# Patient Record
Sex: MALE | Race: WHITE | NOT HISPANIC OR LATINO | URBAN - METROPOLITAN AREA
[De-identification: names, ages, dates, MRNs, and addresses within clinical notes are randomized per-mention and may not be internally consistent; named-entity substitution may affect disease eponyms.]

---

## 2024-11-22 ENCOUNTER — OFFICE VISIT (OUTPATIENT)
Dept: URGENT CARE | Age: 49
End: 2024-11-22

## 2024-11-22 ENCOUNTER — APPOINTMENT (OUTPATIENT)
Dept: CARDIOLOGY | Facility: HOSPITAL | Age: 49
End: 2024-11-22

## 2024-11-22 ENCOUNTER — APPOINTMENT (OUTPATIENT)
Dept: RADIOLOGY | Facility: HOSPITAL | Age: 49
End: 2024-11-22

## 2024-11-22 ENCOUNTER — HOSPITAL ENCOUNTER (INPATIENT)
Facility: HOSPITAL | Age: 49
End: 2024-11-22
Attending: INTERNAL MEDICINE | Admitting: HOSPITALIST

## 2024-11-22 DIAGNOSIS — I21.4 NSTEMI (NON-ST ELEVATED MYOCARDIAL INFARCTION) (MULTI): ICD-10-CM

## 2024-11-22 DIAGNOSIS — I50.9 ACUTE CONGESTIVE HEART FAILURE, UNSPECIFIED HEART FAILURE TYPE: Primary | ICD-10-CM

## 2024-11-22 DIAGNOSIS — I16.0 HYPERTENSIVE URGENCY: ICD-10-CM

## 2024-11-22 DIAGNOSIS — R06.02 SOB (SHORTNESS OF BREATH): Primary | ICD-10-CM

## 2024-11-22 LAB
ALBUMIN SERPL BCP-MCNC: 4.5 G/DL (ref 3.4–5)
ALP SERPL-CCNC: 105 U/L (ref 33–120)
ALT SERPL W P-5'-P-CCNC: 28 U/L (ref 10–52)
ANION GAP SERPL CALC-SCNC: 18 MMOL/L (ref 10–20)
AST SERPL W P-5'-P-CCNC: 25 U/L (ref 9–39)
BASOPHILS # BLD AUTO: 0.05 X10*3/UL (ref 0–0.1)
BASOPHILS NFR BLD AUTO: 0.6 %
BILIRUB SERPL-MCNC: 1.9 MG/DL (ref 0–1.2)
BNP SERPL-MCNC: 1436 PG/ML (ref 0–99)
BUN SERPL-MCNC: 17 MG/DL (ref 6–23)
CALCIUM SERPL-MCNC: 8.8 MG/DL (ref 8.6–10.3)
CARDIAC TROPONIN I PNL SERPL HS: 610 NG/L (ref 0–20)
CARDIAC TROPONIN I PNL SERPL HS: 675 NG/L (ref 0–20)
CHLORIDE SERPL-SCNC: 102 MMOL/L (ref 98–107)
CO2 SERPL-SCNC: 22 MMOL/L (ref 21–32)
CREAT SERPL-MCNC: 1.39 MG/DL (ref 0.5–1.3)
D DIMER PPP FEU-MCNC: 2261 NG/ML FEU
EGFRCR SERPLBLD CKD-EPI 2021: 62 ML/MIN/1.73M*2
EOSINOPHIL # BLD AUTO: 0.07 X10*3/UL (ref 0–0.7)
EOSINOPHIL NFR BLD AUTO: 0.8 %
ERYTHROCYTE [DISTWIDTH] IN BLOOD BY AUTOMATED COUNT: 13.9 % (ref 11.5–14.5)
FLUAV RNA RESP QL NAA+PROBE: NOT DETECTED
FLUBV RNA RESP QL NAA+PROBE: NOT DETECTED
GLUCOSE SERPL-MCNC: 150 MG/DL (ref 74–99)
HCT VFR BLD AUTO: 47.4 % (ref 41–52)
HGB BLD-MCNC: 15.6 G/DL (ref 13.5–17.5)
IMM GRANULOCYTES # BLD AUTO: 0.02 X10*3/UL (ref 0–0.7)
IMM GRANULOCYTES NFR BLD AUTO: 0.2 % (ref 0–0.9)
LACTATE SERPL-SCNC: 1.3 MMOL/L (ref 0.4–2)
LYMPHOCYTES # BLD AUTO: 0.89 X10*3/UL (ref 1.2–4.8)
LYMPHOCYTES NFR BLD AUTO: 9.8 %
MAGNESIUM SERPL-MCNC: 1.96 MG/DL (ref 1.6–2.4)
MCH RBC QN AUTO: 28.1 PG (ref 26–34)
MCHC RBC AUTO-ENTMCNC: 32.9 G/DL (ref 32–36)
MCV RBC AUTO: 85 FL (ref 80–100)
MONOCYTES # BLD AUTO: 0.66 X10*3/UL (ref 0.1–1)
MONOCYTES NFR BLD AUTO: 7.3 %
NEUTROPHILS # BLD AUTO: 7.4 X10*3/UL (ref 1.2–7.7)
NEUTROPHILS NFR BLD AUTO: 81.3 %
NRBC BLD-RTO: 0 /100 WBCS (ref 0–0)
PLATELET # BLD AUTO: 205 X10*3/UL (ref 150–450)
POTASSIUM SERPL-SCNC: 3.7 MMOL/L (ref 3.5–5.3)
PROT SERPL-MCNC: 7.2 G/DL (ref 6.4–8.2)
RBC # BLD AUTO: 5.56 X10*6/UL (ref 4.5–5.9)
RSV RNA RESP QL NAA+PROBE: NOT DETECTED
SARS-COV-2 RNA RESP QL NAA+PROBE: NOT DETECTED
SODIUM SERPL-SCNC: 138 MMOL/L (ref 136–145)
TSH SERPL-ACNC: 3.02 MIU/L (ref 0.44–3.98)
WBC # BLD AUTO: 9.1 X10*3/UL (ref 4.4–11.3)

## 2024-11-22 PROCEDURE — 83036 HEMOGLOBIN GLYCOSYLATED A1C: CPT | Mod: AHULAB | Performed by: HOSPITALIST

## 2024-11-22 PROCEDURE — 80053 COMPREHEN METABOLIC PANEL: CPT | Performed by: INTERNAL MEDICINE

## 2024-11-22 PROCEDURE — 2500000001 HC RX 250 WO HCPCS SELF ADMINISTERED DRUGS (ALT 637 FOR MEDICARE OP)

## 2024-11-22 PROCEDURE — 84484 ASSAY OF TROPONIN QUANT: CPT | Performed by: INTERNAL MEDICINE

## 2024-11-22 PROCEDURE — 83605 ASSAY OF LACTIC ACID: CPT | Performed by: INTERNAL MEDICINE

## 2024-11-22 PROCEDURE — 99285 EMERGENCY DEPT VISIT HI MDM: CPT | Mod: 25 | Performed by: INTERNAL MEDICINE

## 2024-11-22 PROCEDURE — 85379 FIBRIN DEGRADATION QUANT: CPT | Performed by: INTERNAL MEDICINE

## 2024-11-22 PROCEDURE — 2550000001 HC RX 255 CONTRASTS: Performed by: INTERNAL MEDICINE

## 2024-11-22 PROCEDURE — 71046 X-RAY EXAM CHEST 2 VIEWS: CPT | Mod: FOREIGN READ | Performed by: RADIOLOGY

## 2024-11-22 PROCEDURE — 2500000001 HC RX 250 WO HCPCS SELF ADMINISTERED DRUGS (ALT 637 FOR MEDICARE OP): Performed by: INTERNAL MEDICINE

## 2024-11-22 PROCEDURE — 71275 CT ANGIOGRAPHY CHEST: CPT

## 2024-11-22 PROCEDURE — 96365 THER/PROPH/DIAG IV INF INIT: CPT

## 2024-11-22 PROCEDURE — 83735 ASSAY OF MAGNESIUM: CPT | Performed by: INTERNAL MEDICINE

## 2024-11-22 PROCEDURE — 93005 ELECTROCARDIOGRAM TRACING: CPT

## 2024-11-22 PROCEDURE — 85520 HEPARIN ASSAY: CPT | Performed by: INTERNAL MEDICINE

## 2024-11-22 PROCEDURE — 71275 CT ANGIOGRAPHY CHEST: CPT | Mod: FOREIGN READ | Performed by: RADIOLOGY

## 2024-11-22 PROCEDURE — 36415 COLL VENOUS BLD VENIPUNCTURE: CPT | Performed by: INTERNAL MEDICINE

## 2024-11-22 PROCEDURE — 96366 THER/PROPH/DIAG IV INF ADDON: CPT

## 2024-11-22 PROCEDURE — 85025 COMPLETE CBC W/AUTO DIFF WBC: CPT | Performed by: INTERNAL MEDICINE

## 2024-11-22 PROCEDURE — 71046 X-RAY EXAM CHEST 2 VIEWS: CPT

## 2024-11-22 PROCEDURE — 99223 1ST HOSP IP/OBS HIGH 75: CPT | Performed by: HOSPITALIST

## 2024-11-22 PROCEDURE — 2060000001 HC INTERMEDIATE ICU ROOM DAILY

## 2024-11-22 PROCEDURE — 96375 TX/PRO/DX INJ NEW DRUG ADDON: CPT

## 2024-11-22 PROCEDURE — 2500000004 HC RX 250 GENERAL PHARMACY W/ HCPCS (ALT 636 FOR OP/ED): Performed by: INTERNAL MEDICINE

## 2024-11-22 PROCEDURE — 83880 ASSAY OF NATRIURETIC PEPTIDE: CPT | Performed by: INTERNAL MEDICINE

## 2024-11-22 PROCEDURE — 87637 SARSCOV2&INF A&B&RSV AMP PRB: CPT | Performed by: INTERNAL MEDICINE

## 2024-11-22 PROCEDURE — 84443 ASSAY THYROID STIM HORMONE: CPT | Performed by: INTERNAL MEDICINE

## 2024-11-22 RX ORDER — NITROGLYCERIN 0.4 MG/1
0.4 TABLET SUBLINGUAL EVERY 5 MIN PRN
Status: DISCONTINUED | OUTPATIENT
Start: 2024-11-22 | End: 2024-11-26 | Stop reason: HOSPADM

## 2024-11-22 RX ORDER — NAPROXEN SODIUM 220 MG/1
TABLET, FILM COATED ORAL
Status: DISPENSED
Start: 2024-11-22 | End: 2024-11-23

## 2024-11-22 RX ORDER — NAPROXEN SODIUM 220 MG/1
324 TABLET, FILM COATED ORAL ONCE
Status: COMPLETED | OUTPATIENT
Start: 2024-11-22 | End: 2024-11-22

## 2024-11-22 RX ORDER — HYDRALAZINE HYDROCHLORIDE 20 MG/ML
20 INJECTION INTRAMUSCULAR; INTRAVENOUS ONCE
Status: COMPLETED | OUTPATIENT
Start: 2024-11-22 | End: 2024-11-22

## 2024-11-22 RX ORDER — FUROSEMIDE 10 MG/ML
40 INJECTION INTRAMUSCULAR; INTRAVENOUS ONCE
Status: COMPLETED | OUTPATIENT
Start: 2024-11-22 | End: 2024-11-22

## 2024-11-22 RX ORDER — HEPARIN SODIUM 10000 [USP'U]/100ML
0-4000 INJECTION, SOLUTION INTRAVENOUS CONTINUOUS
Status: DISCONTINUED | OUTPATIENT
Start: 2024-11-22 | End: 2024-11-23

## 2024-11-22 RX ORDER — LABETALOL HYDROCHLORIDE 5 MG/ML
20 INJECTION, SOLUTION INTRAVENOUS ONCE
Status: COMPLETED | OUTPATIENT
Start: 2024-11-22 | End: 2024-11-22

## 2024-11-22 RX ORDER — NITROGLYCERIN 0.4 MG/1
0.4 TABLET SUBLINGUAL ONCE
Status: COMPLETED | OUTPATIENT
Start: 2024-11-22 | End: 2024-11-22

## 2024-11-22 RX ORDER — MORPHINE SULFATE 4 MG/ML
4 INJECTION, SOLUTION INTRAMUSCULAR; INTRAVENOUS ONCE
Status: COMPLETED | OUTPATIENT
Start: 2024-11-22 | End: 2024-11-22

## 2024-11-22 RX ORDER — NITROGLYCERIN 0.4 MG/1
TABLET SUBLINGUAL
Status: COMPLETED
Start: 2024-11-22 | End: 2024-11-22

## 2024-11-22 ASSESSMENT — PAIN - FUNCTIONAL ASSESSMENT
PAIN_FUNCTIONAL_ASSESSMENT: 0-10
PAIN_FUNCTIONAL_ASSESSMENT: 0-10

## 2024-11-22 ASSESSMENT — PAIN DESCRIPTION - DESCRIPTORS
DESCRIPTORS: PRESSURE
DESCRIPTORS: PRESSURE

## 2024-11-22 ASSESSMENT — PAIN DESCRIPTION - PAIN TYPE: TYPE: ACUTE PAIN

## 2024-11-22 ASSESSMENT — PAIN SCALES - GENERAL
PAINLEVEL_OUTOF10: 4
PAINLEVEL_OUTOF10: 6
PAINLEVEL_OUTOF10: 4
PAINLEVEL_OUTOF10: 8
PAINLEVEL_OUTOF10: 0 - NO PAIN
PAINLEVEL_OUTOF10: 8

## 2024-11-22 ASSESSMENT — PAIN DESCRIPTION - LOCATION
LOCATION: CHEST
LOCATION: CHEST

## 2024-11-22 NOTE — ED PROVIDER NOTES
HPI     CC: No chief complaint on file.     HPI: Polo Orellana is a 49 y.o. male with no past medical history other than elevated BMI and possible HTN (has not seen a primary care physician in over 30 years) presents with chest pain and shortness of breath.  Patient states that for the past few months he has been experiencing exertional chest tightness and shortness of breath.  Symptoms acutely worsened today.  He has associated orthopnea, leg swelling, PND, and abdominal bloating over this time period.  He has a mild dry cough, denies fevers or chills.  He quit drinking about 2 weeks ago, previously drank 4-5 high ABV IPAs per day.  He is a non-smoker, does not use drugs.  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  He states he has been told his blood pressure is elevated when he has presented intermittently to urgent care in the past, has never been on blood pressure medications.  He was referred here from urgent care today.    ROS: 10-point review of systems was performed and is otherwise negative except as noted in HPI.    Limitations to history: N/A     Independent Historians: N/A    External Records Reviewed: Outpatient notes in EMR    Past Medical History: Noncontributory except per HPI     Past Surgical History: Noncontributory except per HPI     Family History: Reviewed and noncontributory     Social History:  Denies tobacco. Denies ETOH. Denies illicit drugs.    Social Determinants Affecting Care: N/A    No Known Allergies    Home Meds:   Current Outpatient Medications   Medication Instructions    oxygen (O2) gas therapy 1 each, inhalation, Continuous        Physical Exam     ED Triage Vitals [11/22/24 1439]   Temp Heart Rate Respirations BP   -- (!) 116 20 (!) 216/157      SpO2 Temp src Heart Rate Source Patient Position   -- -- Monitor Sitting      BP Location FiO2 (%)     Left arm --         Heart Rate:  []   Temperature:  [36.3 °C (97.3 °F)]    Respirations:  [20]   BP: (177-226)/(114-158)   Weight:  [135 kg (296 lb 8.3 oz)]      Physical Exam  Vitals and nursing note reviewed.     CONSTITUTIONAL: Well appearing, well nourished, obese male in no acute distress.   HENMT: Head atraumatic. Airway patent. Nasal mucosa clear. Mouth with normal mucosa, clear oropharynx. Uvula midline. Neck supple.    EYES: Clear bilaterally, pupils equally round and reactive to light.   CARDIOVASCULAR: Normal rate, regular rhythm.  Heart sounds S1, S2.  No murmurs, rubs or gallops. Normal pulses. Capillary refill < 2 sec.   RESPIRATORY: No increased work of breathing. Bibasilar rales R>L  GASTROINTESTINAL: Abdomen soft, protuberant, non-tender. No rebound, no guarding. Normal bowel sounds. No palpable masses.  GENITOURINARY:  No CVA tenderness.  MUSCULOSKELETAL: Spine appears normal, range of motion is not limited, no muscle or joint tenderness. 2+ BLE edema.   NEUROLOGICAL: Alert and oriented, no asymmetry, moving all extremities equally.  SKIN: Facial erythema. Warm, dry and intact. No rash or notable lesions.  PSYCHIATRIC: Normal mood and affect.  HEME/LYMPH: No adenopathy or splenomegaly.    Diagnostic Results      ECG: ECGs read and interpreted by me. See ED Course, below, for interpretation.    Labs Reviewed   CBC WITH AUTO DIFFERENTIAL - Abnormal       Result Value    WBC 9.1      nRBC 0.0      RBC 5.56      Hemoglobin 15.6      Hematocrit 47.4      MCV 85      MCH 28.1      MCHC 32.9      RDW 13.9      Platelets 205      Neutrophils % 81.3      Immature Granulocytes %, Automated 0.2      Lymphocytes % 9.8      Monocytes % 7.3      Eosinophils % 0.8      Basophils % 0.6      Neutrophils Absolute 7.40      Immature Granulocytes Absolute, Automated 0.02      Lymphocytes Absolute 0.89 (*)     Monocytes Absolute 0.66      Eosinophils Absolute 0.07      Basophils Absolute 0.05     COMPREHENSIVE METABOLIC PANEL - Abnormal    Glucose 150 (*)     Sodium 138      Potassium 3.7       Chloride 102      Bicarbonate 22      Anion Gap 18      Urea Nitrogen 17      Creatinine 1.39 (*)     eGFR 62      Calcium 8.8      Albumin 4.5      Alkaline Phosphatase 105      Total Protein 7.2      AST 25      Bilirubin, Total 1.9 (*)     ALT 28     B-TYPE NATRIURETIC PEPTIDE - Abnormal    BNP 1,436 (*)     Narrative:        <100 pg/mL - Heart failure unlikely  100-299 pg/mL - Intermediate probability of acute heart                  failure exacerbation. Correlate with clinical                  context and patient history.    >=300 pg/mL - Heart Failure likely. Correlate with clinical                  context and patient history.    BNP testing is performed using different testing methodology at [facility] than at other [facility]. Direct result comparisons should only be made within the same method.      D-DIMER, NON VTE - Abnormal    D-Dimer Non VTE, Quant (ng/mL FEU) 2,261 (*)     Narrative:     The D-Dimer assay is reported in ng/mL Fibrinogen Equivalent Units (FEU). The results of this assay should NOT be used for the exclusion of Deep Vein Thrombosis and/or Pulmonary Embolism.   SERIAL TROPONIN-INITIAL - Abnormal    Troponin I, High Sensitivity 610 (*)     Narrative:     Less than 99th percentile of normal range cutoff-  Female and children under 18 years old <14 ng/L; Male <21 ng/L: Negative  Repeat testing should be performed if clinically indicated.     Female and children under 18 years old 14-50 ng/L; Male 21-50 ng/L:  Consistent with possible cardiac damage and possible increased clinical   risk. Serial measurements may help to assess extent of myocardial damage.     >50 ng/L: Consistent with cardiac damage, increased clinical risk and  myocardial infarction. Serial measurements may help assess extent of   myocardial damage.      NOTE: Children less than 1 year old may have higher baseline troponin   levels and results should be interpreted in conjunction with the overall    clinical context.     NOTE: Troponin I testing is performed using a different   testing methodology at [facility] than at other   [facility]. Direct result comparisons should only   be made within the same method.   SERIAL TROPONIN, 1 HOUR - Abnormal    Troponin I, High Sensitivity 675 (*)     Narrative:     Less than 99th percentile of normal range cutoff-  Female and children under 18 years old <14 ng/L; Male <21 ng/L: Negative  Repeat testing should be performed if clinically indicated.     Female and children under 18 years old 14-50 ng/L; Male 21-50 ng/L:  Consistent with possible cardiac damage and possible increased clinical   risk. Serial measurements may help to assess extent of myocardial damage.     >50 ng/L: Consistent with cardiac damage, increased clinical risk and  myocardial infarction. Serial measurements may help assess extent of   myocardial damage.      NOTE: Children less than 1 year old may have higher baseline troponin   levels and results should be interpreted in conjunction with the overall   clinical context.     NOTE: Troponin I testing is performed using a different   testing methodology at [facility] than at other   [facility]. Direct result comparisons should only   be made within the same method.   MAGNESIUM - Normal    Magnesium 1.96     LACTATE - Normal    Lactate 1.3      Narrative:     Venipuncture immediately after or during the administration of Metamizole may lead to falsely low results. Testing should be performed immediately prior to Metamizole dosing.   TSH WITH REFLEX TO FREE T4 IF ABNORMAL - Normal    Thyroid Stimulating Hormone 3.02      Narrative:     TSH testing is performed using different testing methodology at [facility] than at other [facility]. Direct result comparisons should only be made within the same method.     SARS-COV-2 PCR - Normal    Coronavirus 2019, PCR Not Detected      Narrative:     This  assay has received FDA Emergency Use Authorization (EUA) and is only authorized for the duration of time that circumstances exist to justify the authorization of the emergency use of in vitro diagnostic tests for the detection of SARS-CoV-2 virus and/or diagnosis of COVID-19 infection under section 564(b)(1) of the Act, 21 U.S.C. 360bbb-3(b)(1). This assay is an in vitro diagnostic nucleic acid amplification test for the qualitative detection of SARS-CoV-2 from nasopharyngeal specimens and has been validated for use at [facility]. Negative results do not preclude COVID-19 infections and should not be used as the sole basis for diagnosis, treatment, or other management decisions.     RSV PCR - Normal    RSV PCR Not Detected      Narrative:     This assay is an FDA-cleared, in vitro diagnostic nucleic acid amplification test for the detection of RSV from nasopharyngeal specimens, and has been validated for use at [facility]. Negative results do not preclude RSV infections, and should not be used as the sole basis for diagnosis, treatment, or other management decisions. If Influenza A/B and RSV PCR results are negative, testing for Parainfluenza virus, Adenovirus and Metapneumovirus is routinely performed for pediatric oncology and intensive care inpatients at [facility], and is available on other patients by placing an add-on request.       INFLUENZA A AND B PCR - Normal    Flu A Result Not Detected      Flu B Result Not Detected      Narrative:     This assay is an in vitro diagnostic multiplex nucleic acid amplification test for the detection and discrimination of Influenza A & B from nasopharyngeal specimens, and has been validated for use at [facility]. Negative results do not preclude Influenza A/B infections, and should not be used as the sole basis for diagnosis, treatment, or other management decisions. If Influenza A/B and RSV PCR results are  negative, testing for Parainfluenza virus, Adenovirus and Metapneumovirus is routinely performed for [facility] pediatric oncology and intensive care inpatients, and is available on other patients by placing an add-on request.   TROPONIN SERIES- (INITIAL, 1 HR)    Narrative:     The following orders were created for panel order Troponin I Series, High Sensitivity (0, 1 HR).  Procedure                               Abnormality         Status                     ---------                               -----------         ------                     Troponin I, High Sensiti...[[id number]]  Abnormal            Final result               Troponin, High Sensitivi...[[id number]]  Abnormal            Final result                 Please view results for these tests on the individual orders.   TROPONIN I, HIGH SENSITIVITY         CT angio chest for pulmonary embolism   Final Result   1. No acute or chronic pulmonary emboli.   2. No thoracic aortic aneurysm or dissection.   3.  Moderate right and mild left pleural effusion.   4. Mild perihepatic ascites.   Signed by Bernard De La Vega MD      XR chest 2 views   Final Result   Mild pulmonary vascular congestion.   Signed by William Jackson M.D.                    Pansey Coma Scale Score: 15                  Procedure  Procedures    ED Course & MDM   Assessment/Plan:   Polo Orellana is a 49 y.o. male with no past medical history other than elevated BMI and possible HTN (has not seen a primary care physician in over 30 years) presents with chest pain and shortness of breath.  Symptoms worsened acutely today after a few months of symptoms.  Presentation is concerning for a possible acute CHF exacerbation given recent signs of peripheral edema, orthopnea, PND, leg and abdominal swelling.  He is notably hypertensive, suspicious for prolonged uncontrolled hypertension given lack of primary care.  ECG shows sinus tachycardia with LVH, left atrial enlargement, and left axis deviation.  No signs  acutely of DVT on exam but with tachycardia, leg swelling, and chest pain PE is on the differential.  Workup was initiated with labs including serial troponins and D-dimer, chest x-ray.  He was given a dose of labetalol for his blood pressure. See below for details of ED course and ultimate disposition.    Medications   aspirin chewable tablet  - Omnicell Override Pull (has no administration in time range)   nitroglycerin (Nitrostat) SL tablet 0.4 mg (0.4 mg sublingual Given 11/22/24 1705)   heparin 25,000 Units in dextrose 5% 250 mL (100 Units/mL) infusion (premix) (1,200 Units/hr intravenous New Bag 11/22/24 1846)   heparin bolus from bag 3,000-4,000 Units (has no administration in time range)   labetaloL (Normodyne,Trandate) injection 20 mg (20 mg intravenous Given 11/22/24 1622)   nitroglycerin (Nitrostat) SL tablet 0.4 mg (0.4 mg sublingual Given 11/22/24 1642)   aspirin chewable tablet 324 mg (324 mg oral Given 11/22/24 1640)   heparin bolus from bag 4,000 Units (4,000 Units intravenous Bolus from Bag 11/22/24 1851)   morphine injection 4 mg (4 mg intravenous Given 11/22/24 1822)   hydrALAZINE (Apresoline) injection 20 mg (20 mg intravenous Given 11/22/24 1838)   furosemide (Lasix) injection 40 mg (40 mg intravenous Given 11/22/24 1840)   iohexol (OMNIPaque) 350 mg iodine/mL solution 75 mL (75 mL intravenous Given 11/22/24 1829)        ED Course as of 11/22/24 2244   Fri Nov 22, 2024   1511 ECG read interpreted by me.  Sinus tachycardia, rate 116.  Leftward axis.  Left atrial large man.  Incomplete RBBB.  LVH.  No significant ST or T wave changes. [CG]   1631 CXR Mild pulmonary vascular congestion. [CG]   1632 Initial labs notable for normal CBC, CMP with elevated creatinine 1.39, provide using 1.96, elevated troponin 610 [CG]   1639  mg given and nitroglycerin trialed [CG]   1725 Pain improved a little with first dose of nitro but then recurred after second and third doses. [CG]   1725 Troponin rising  675.  Heparin drip started. [CG]   1726 D-dimer elevated 2260. CTPE ordered. [CG]   1726 BNP elevated 1436.  [CG]   1807 Primary 86.  Possible left atrial enlargement.  Incomplete RBBB.  No significant ST or T wave abnormalities. [CG]   1941 CTPE no PE. Moderate right and mild left pleural effusion. Mild perihepatic ascites.   [CG]   2030 Patient was admitted to the stepdown unit for further management.  I did discuss with the case with Dr. Young from cardiology will consult, suspect troponin elevation in the setting of type II MI from CHF/hypertension rather than true NSTEMI. [CG]      ED Course User Index  [CG] Sunshine Wade MD         Diagnoses as of 11/22/24 2244   Acute congestive heart failure, unspecified heart failure type   NSTEMI (non-ST elevated myocardial infarction) (Multi)   Hypertensive urgency       Disposition:   Admitted to SDU, discussed differential and findings with patient as well as any family members at bedside.      ED Prescriptions    None         Sunshine Wade MD  EM/IM/Peds    This note was dictated by speech recognition. Minor errors in transcription may be present.     Sunshine Wdae MD  11/22/24 7274

## 2024-11-22 NOTE — ED NOTES
He was working and has felt short of breathe with excersion. He was feeling sudden chest pressure so bad he felt like he couldn't breath. He does feel better than when he called. He recently quit drinking alcohol 2 weeks ago.      Dina Valenzuela RN  11/22/24 1125

## 2024-11-22 NOTE — PROGRESS NOTES
Subjective   History of Present Illness: Polo Orellana is a 49 y.o. male. They present today with a chief complaint of SOB and chest tightness x 2 hours ago. It started at his work. No PMH according to pt. No other symptoms.       Past Medical History  Allergies as of 11/22/2024    (No Known Allergies)       (Not in a hospital admission)       No past medical history on file.    Past Surgical History:   Procedure Laterality Date    OTHER SURGICAL HISTORY  11/20/2020    No history of surgery            Review of Systems  Review of Systems                               Objective    There were no vitals filed for this visit.  No LMP for male patient.    Physical Exam  Constitutional:       Appearance: He is ill-appearing.   HENT:      Head: Normocephalic and atraumatic.      Nose: Nose normal.      Mouth/Throat:      Mouth: Mucous membranes are moist.   Eyes:      Extraocular Movements: Extraocular movements intact.      Conjunctiva/sclera: Conjunctivae normal.      Pupils: Pupils are equal, round, and reactive to light.   Cardiovascular:      Rate and Rhythm: Tachycardia present.   Pulmonary:      Effort: Pulmonary effort is normal.   Skin:     General: Skin is warm.   Neurological:      Mental Status: He is alert and oriented to person, place, and time.   Psychiatric:         Mood and Affect: Mood normal.         Behavior: Behavior normal.             Point of Care Test & Imaging Results from this visit  No results found for this visit on 11/22/24.   No results found.    Diagnostic study results (if any) were reviewed by Carlita Ratliff PA-C.    Assessment/Plan   Allergies, medications, history, and pertinent labs/EKGs/Imaging reviewed by Carlita Ratliff PA-C.     Medical Decision Making  Pt pulse ox at triage was 88% and  he was put on 3L o2 NS. Pulse ox remained at 98%. EKG showed cardiac arrhythmias. Squad team was called to  and pt was transferred to the ED.     Orders and Diagnoses  Diagnoses and all  orders for this visit:  SOB (shortness of breath)  -     ECG 12 lead; Future  -     oxygen (O2) gas therapy; Inhale 1 each continuously.      Medical Admin Record      Patient disposition: ED    Electronically signed by Carlita Ratliff PA-C  2:12 PM

## 2024-11-23 ENCOUNTER — APPOINTMENT (OUTPATIENT)
Dept: RADIOLOGY | Facility: HOSPITAL | Age: 49
End: 2024-11-23

## 2024-11-23 LAB
ANION GAP SERPL CALC-SCNC: 13 MMOL/L (ref 10–20)
BASOPHILS # BLD AUTO: 0.06 X10*3/UL (ref 0–0.1)
BASOPHILS NFR BLD AUTO: 1 %
BUN SERPL-MCNC: 17 MG/DL (ref 6–23)
CALCIUM SERPL-MCNC: 8.3 MG/DL (ref 8.6–10.3)
CARDIAC TROPONIN I PNL SERPL HS: 704 NG/L (ref 0–20)
CARDIAC TROPONIN I PNL SERPL HS: 764 NG/L (ref 0–20)
CHLORIDE SERPL-SCNC: 104 MMOL/L (ref 98–107)
CHOLEST SERPL-MCNC: 133 MG/DL (ref 0–199)
CHOLESTEROL/HDL RATIO: 4.6
CO2 SERPL-SCNC: 27 MMOL/L (ref 21–32)
CREAT SERPL-MCNC: 1.29 MG/DL (ref 0.5–1.3)
EGFRCR SERPLBLD CKD-EPI 2021: 68 ML/MIN/1.73M*2
EOSINOPHIL # BLD AUTO: 0.14 X10*3/UL (ref 0–0.7)
EOSINOPHIL NFR BLD AUTO: 2.4 %
ERYTHROCYTE [DISTWIDTH] IN BLOOD BY AUTOMATED COUNT: 14.1 % (ref 11.5–14.5)
EST. AVERAGE GLUCOSE BLD GHB EST-MCNC: 157 MG/DL
GLUCOSE SERPL-MCNC: 147 MG/DL (ref 74–99)
HBA1C MFR BLD: 7.1 %
HCT VFR BLD AUTO: 44.6 % (ref 41–52)
HDLC SERPL-MCNC: 29.1 MG/DL
HGB BLD-MCNC: 14.7 G/DL (ref 13.5–17.5)
IMM GRANULOCYTES # BLD AUTO: 0.02 X10*3/UL (ref 0–0.7)
IMM GRANULOCYTES NFR BLD AUTO: 0.3 % (ref 0–0.9)
LDLC SERPL CALC-MCNC: 90 MG/DL
LYMPHOCYTES # BLD AUTO: 1.08 X10*3/UL (ref 1.2–4.8)
LYMPHOCYTES NFR BLD AUTO: 18.5 %
MAGNESIUM SERPL-MCNC: 1.97 MG/DL (ref 1.6–2.4)
MCH RBC QN AUTO: 27.9 PG (ref 26–34)
MCHC RBC AUTO-ENTMCNC: 33 G/DL (ref 32–36)
MCV RBC AUTO: 85 FL (ref 80–100)
MONOCYTES # BLD AUTO: 0.67 X10*3/UL (ref 0.1–1)
MONOCYTES NFR BLD AUTO: 11.5 %
NEUTROPHILS # BLD AUTO: 3.87 X10*3/UL (ref 1.2–7.7)
NEUTROPHILS NFR BLD AUTO: 66.3 %
NON HDL CHOLESTEROL: 104 MG/DL (ref 0–149)
NRBC BLD-RTO: 0 /100 WBCS (ref 0–0)
PLATELET # BLD AUTO: 200 X10*3/UL (ref 150–450)
POTASSIUM SERPL-SCNC: 3.7 MMOL/L (ref 3.5–5.3)
RBC # BLD AUTO: 5.26 X10*6/UL (ref 4.5–5.9)
SODIUM SERPL-SCNC: 140 MMOL/L (ref 136–145)
TRIGL SERPL-MCNC: 70 MG/DL (ref 0–149)
TSH SERPL-ACNC: 5.68 MIU/L (ref 0.44–3.98)
UFH PPP CHRO-ACNC: 0.2 IU/ML
UFH PPP CHRO-ACNC: 0.2 IU/ML
UFH PPP CHRO-ACNC: 0.4 IU/ML
VLDL: 14 MG/DL (ref 0–40)
WBC # BLD AUTO: 5.8 X10*3/UL (ref 4.4–11.3)

## 2024-11-23 PROCEDURE — 2500000004 HC RX 250 GENERAL PHARMACY W/ HCPCS (ALT 636 FOR OP/ED): Performed by: HOSPITALIST

## 2024-11-23 PROCEDURE — 36415 COLL VENOUS BLD VENIPUNCTURE: CPT | Performed by: INTERNAL MEDICINE

## 2024-11-23 PROCEDURE — 99233 SBSQ HOSP IP/OBS HIGH 50: CPT | Performed by: INTERNAL MEDICINE

## 2024-11-23 PROCEDURE — 84443 ASSAY THYROID STIM HORMONE: CPT | Performed by: HOSPITALIST

## 2024-11-23 PROCEDURE — 85520 HEPARIN ASSAY: CPT | Performed by: INTERNAL MEDICINE

## 2024-11-23 PROCEDURE — 99223 1ST HOSP IP/OBS HIGH 75: CPT | Performed by: INTERNAL MEDICINE

## 2024-11-23 PROCEDURE — 2060000001 HC INTERMEDIATE ICU ROOM DAILY

## 2024-11-23 PROCEDURE — 2500000001 HC RX 250 WO HCPCS SELF ADMINISTERED DRUGS (ALT 637 FOR MEDICARE OP): Performed by: INTERNAL MEDICINE

## 2024-11-23 PROCEDURE — 83735 ASSAY OF MAGNESIUM: CPT | Performed by: HOSPITALIST

## 2024-11-23 PROCEDURE — 76770 US EXAM ABDO BACK WALL COMP: CPT

## 2024-11-23 PROCEDURE — 2500000002 HC RX 250 W HCPCS SELF ADMINISTERED DRUGS (ALT 637 FOR MEDICARE OP, ALT 636 FOR OP/ED): Performed by: INTERNAL MEDICINE

## 2024-11-23 PROCEDURE — 2500000001 HC RX 250 WO HCPCS SELF ADMINISTERED DRUGS (ALT 637 FOR MEDICARE OP): Performed by: HOSPITALIST

## 2024-11-23 PROCEDURE — 84484 ASSAY OF TROPONIN QUANT: CPT | Performed by: NURSE PRACTITIONER

## 2024-11-23 PROCEDURE — 82374 ASSAY BLOOD CARBON DIOXIDE: CPT | Performed by: HOSPITALIST

## 2024-11-23 PROCEDURE — 80061 LIPID PANEL: CPT | Performed by: HOSPITALIST

## 2024-11-23 PROCEDURE — 85025 COMPLETE CBC W/AUTO DIFF WBC: CPT | Performed by: HOSPITALIST

## 2024-11-23 PROCEDURE — 76770 US EXAM ABDO BACK WALL COMP: CPT | Performed by: RADIOLOGY

## 2024-11-23 PROCEDURE — 2500000004 HC RX 250 GENERAL PHARMACY W/ HCPCS (ALT 636 FOR OP/ED): Performed by: INTERNAL MEDICINE

## 2024-11-23 RX ORDER — ASPIRIN 81 MG/1
81 TABLET ORAL DAILY
Status: DISCONTINUED | OUTPATIENT
Start: 2024-11-24 | End: 2024-11-26 | Stop reason: HOSPADM

## 2024-11-23 RX ORDER — HYDRALAZINE HYDROCHLORIDE 25 MG/1
25 TABLET, FILM COATED ORAL 3 TIMES DAILY
Status: DISCONTINUED | OUTPATIENT
Start: 2024-11-23 | End: 2024-11-23

## 2024-11-23 RX ORDER — ONDANSETRON 4 MG/1
4 TABLET, ORALLY DISINTEGRATING ORAL EVERY 8 HOURS PRN
Status: DISCONTINUED | OUTPATIENT
Start: 2024-11-23 | End: 2024-11-26 | Stop reason: HOSPADM

## 2024-11-23 RX ORDER — ATORVASTATIN CALCIUM 40 MG/1
40 TABLET, FILM COATED ORAL NIGHTLY
Status: DISCONTINUED | OUTPATIENT
Start: 2024-11-23 | End: 2024-11-23

## 2024-11-23 RX ORDER — SPIRONOLACTONE 25 MG/1
25 TABLET ORAL DAILY
Status: DISCONTINUED | OUTPATIENT
Start: 2024-11-23 | End: 2024-11-26 | Stop reason: HOSPADM

## 2024-11-23 RX ORDER — ENOXAPARIN SODIUM 100 MG/ML
40 INJECTION SUBCUTANEOUS EVERY 24 HOURS
Status: DISCONTINUED | OUTPATIENT
Start: 2024-11-24 | End: 2024-11-26 | Stop reason: HOSPADM

## 2024-11-23 RX ORDER — LOSARTAN POTASSIUM 50 MG/1
50 TABLET ORAL DAILY
Status: DISCONTINUED | OUTPATIENT
Start: 2024-11-23 | End: 2024-11-25

## 2024-11-23 RX ORDER — HYDRALAZINE HYDROCHLORIDE 20 MG/ML
10 INJECTION INTRAMUSCULAR; INTRAVENOUS EVERY 6 HOURS PRN
Status: DISCONTINUED | OUTPATIENT
Start: 2024-11-23 | End: 2024-11-26 | Stop reason: HOSPADM

## 2024-11-23 RX ORDER — ONDANSETRON HYDROCHLORIDE 2 MG/ML
4 INJECTION, SOLUTION INTRAVENOUS EVERY 8 HOURS PRN
Status: DISCONTINUED | OUTPATIENT
Start: 2024-11-23 | End: 2024-11-26 | Stop reason: HOSPADM

## 2024-11-23 RX ORDER — FUROSEMIDE 10 MG/ML
20 INJECTION INTRAMUSCULAR; INTRAVENOUS 2 TIMES DAILY
Status: DISCONTINUED | OUTPATIENT
Start: 2024-11-23 | End: 2024-11-25

## 2024-11-23 RX ORDER — METOPROLOL TARTRATE 25 MG/1
25 TABLET, FILM COATED ORAL 2 TIMES DAILY
Status: DISCONTINUED | OUTPATIENT
Start: 2024-11-23 | End: 2024-11-25

## 2024-11-23 RX ORDER — METOPROLOL TARTRATE 25 MG/1
25 TABLET, FILM COATED ORAL ONCE
Status: COMPLETED | OUTPATIENT
Start: 2024-11-23 | End: 2024-11-23

## 2024-11-23 SDOH — SOCIAL STABILITY: SOCIAL INSECURITY: DO YOU FEEL UNSAFE GOING BACK TO THE PLACE WHERE YOU ARE LIVING?: NO

## 2024-11-23 SDOH — SOCIAL STABILITY: SOCIAL INSECURITY: ARE YOU OR HAVE YOU BEEN THREATENED OR ABUSED PHYSICALLY, EMOTIONALLY, OR SEXUALLY BY ANYONE?: NO

## 2024-11-23 SDOH — SOCIAL STABILITY: SOCIAL INSECURITY: HAVE YOU HAD THOUGHTS OF HARMING ANYONE ELSE?: NO

## 2024-11-23 SDOH — SOCIAL STABILITY: SOCIAL INSECURITY: WERE YOU ABLE TO COMPLETE ALL THE BEHAVIORAL HEALTH SCREENINGS?: YES

## 2024-11-23 SDOH — SOCIAL STABILITY: SOCIAL INSECURITY: HAS ANYONE EVER THREATENED TO HURT YOUR FAMILY OR YOUR PETS?: NO

## 2024-11-23 SDOH — SOCIAL STABILITY: SOCIAL INSECURITY: DOES ANYONE TRY TO KEEP YOU FROM HAVING/CONTACTING OTHER FRIENDS OR DOING THINGS OUTSIDE YOUR HOME?: NO

## 2024-11-23 SDOH — SOCIAL STABILITY: SOCIAL INSECURITY: DO YOU FEEL ANYONE HAS EXPLOITED OR TAKEN ADVANTAGE OF YOU FINANCIALLY OR OF YOUR PERSONAL PROPERTY?: NO

## 2024-11-23 SDOH — SOCIAL STABILITY: SOCIAL INSECURITY: HAVE YOU HAD ANY THOUGHTS OF HARMING ANYONE ELSE?: NO

## 2024-11-23 SDOH — SOCIAL STABILITY: SOCIAL INSECURITY: ARE THERE ANY APPARENT SIGNS OF INJURIES/BEHAVIORS THAT COULD BE RELATED TO ABUSE/NEGLECT?: NO

## 2024-11-23 SDOH — SOCIAL STABILITY: SOCIAL INSECURITY: ABUSE: ADULT

## 2024-11-23 ASSESSMENT — COGNITIVE AND FUNCTIONAL STATUS - GENERAL
DAILY ACTIVITIY SCORE: 24
PATIENT BASELINE BEDBOUND: NO
MOBILITY SCORE: 24
DAILY ACTIVITIY SCORE: 24
DAILY ACTIVITIY SCORE: 24

## 2024-11-23 ASSESSMENT — LIFESTYLE VARIABLES
SKIP TO QUESTIONS 9-10: 1
AUDIT-C TOTAL SCORE: 0
HOW OFTEN DO YOU HAVE A DRINK CONTAINING ALCOHOL: NEVER
AUDIT-C TOTAL SCORE: 0
SUBSTANCE_ABUSE_PAST_12_MONTHS: NO
HOW MANY STANDARD DRINKS CONTAINING ALCOHOL DO YOU HAVE ON A TYPICAL DAY: PATIENT DOES NOT DRINK
PRESCIPTION_ABUSE_PAST_12_MONTHS: NO
HOW OFTEN DO YOU HAVE 6 OR MORE DRINKS ON ONE OCCASION: NEVER

## 2024-11-23 ASSESSMENT — ENCOUNTER SYMPTOMS
EYES NEGATIVE: 1
DIARRHEA: 0
SHORTNESS OF BREATH: 1
ALLERGIC/IMMUNOLOGIC NEGATIVE: 1
GASTROINTESTINAL NEGATIVE: 1
COUGH: 1
PSYCHIATRIC NEGATIVE: 1
CONSTITUTIONAL NEGATIVE: 1
NAUSEA: 0
CHEST TIGHTNESS: 1
MUSCULOSKELETAL NEGATIVE: 1
PALPITATIONS: 1
VOMITING: 0
NEUROLOGICAL NEGATIVE: 1
HEMATOLOGIC/LYMPHATIC NEGATIVE: 1

## 2024-11-23 ASSESSMENT — ACTIVITIES OF DAILY LIVING (ADL)
WALKS IN HOME: INDEPENDENT
PATIENT'S MEMORY ADEQUATE TO SAFELY COMPLETE DAILY ACTIVITIES?: YES
LACK_OF_TRANSPORTATION: NO
HEARING - LEFT EAR: FUNCTIONAL
LACK_OF_TRANSPORTATION: NO
HEARING - RIGHT EAR: FUNCTIONAL
JUDGMENT_ADEQUATE_SAFELY_COMPLETE_DAILY_ACTIVITIES: YES
BATHING: INDEPENDENT
TOILETING: NEEDS ASSISTANCE
ADEQUATE_TO_COMPLETE_ADL: YES
FEEDING YOURSELF: INDEPENDENT
LACK_OF_TRANSPORTATION: NO
LACK_OF_TRANSPORTATION: NO
DRESSING YOURSELF: INDEPENDENT
GROOMING: INDEPENDENT

## 2024-11-23 ASSESSMENT — PAIN - FUNCTIONAL ASSESSMENT
PAIN_FUNCTIONAL_ASSESSMENT: 0-10
PAIN_FUNCTIONAL_ASSESSMENT: 0-10

## 2024-11-23 ASSESSMENT — PATIENT HEALTH QUESTIONNAIRE - PHQ9
1. LITTLE INTEREST OR PLEASURE IN DOING THINGS: NOT AT ALL
2. FEELING DOWN, DEPRESSED OR HOPELESS: NOT AT ALL
SUM OF ALL RESPONSES TO PHQ9 QUESTIONS 1 & 2: 0

## 2024-11-23 ASSESSMENT — PAIN SCALES - GENERAL
PAINLEVEL_OUTOF10: 0 - NO PAIN
PAINLEVEL_OUTOF10: 0 - NO PAIN

## 2024-11-23 NOTE — PROGRESS NOTES
Polo Orellana is a 49 y.o. male on day 1 of admission presenting with Acute congestive heart failure, unspecified heart failure type.      Subjective   Pt seen and examined.        Objective     Last Recorded Vitals  BP (!) 175/127 (BP Location: Left arm, Patient Position: Lying)   Pulse 83   Temp 36.3 °C (97.3 °F) (Temporal)   Resp 18   Wt 135 kg (296 lb 8.3 oz)   SpO2 97%   Intake/Output last 3 Shifts:    Intake/Output Summary (Last 24 hours) at 11/23/2024 1041  Last data filed at 11/23/2024 0623  Gross per 24 hour   Intake --   Output 2750 ml   Net -2750 ml       Admission Weight  Weight: 135 kg (296 lb 8.3 oz) (11/22/24 1708)    Daily Weight  11/22/24 : 135 kg (296 lb 8.3 oz)      Physical Exam  Constitutional: No acute distress, awake, alert  Head/Neck: Neck supple  Respiratory/Thorax: Lungs are Clear to auscultation  Cardiovascular: Regular, rate and rhythm,  2+ equal pulses of the extremities, normal S 1and S 2  Gastrointestinal: Nondistended, soft, non-tender, no rebound tenderness or guarding  Extremities: BLE edema  Neurological: Awake and alert. No focal neurological deficits  Psychological: Appropriate mood and behavior    Relevant Results  Results for orders placed or performed during the hospital encounter of 11/22/24 (from the past 24 hours)   CBC and Auto Differential   Result Value Ref Range    WBC 9.1 4.4 - 11.3 x10*3/uL    nRBC 0.0 0.0 - 0.0 /100 WBCs    RBC 5.56 4.50 - 5.90 x10*6/uL    Hemoglobin 15.6 13.5 - 17.5 g/dL    Hematocrit 47.4 41.0 - 52.0 %    MCV 85 80 - 100 fL    MCH 28.1 26.0 - 34.0 pg    MCHC 32.9 32.0 - 36.0 g/dL    RDW 13.9 11.5 - 14.5 %    Platelets 205 150 - 450 x10*3/uL    Neutrophils % 81.3 40.0 - 80.0 %    Immature Granulocytes %, Automated 0.2 0.0 - 0.9 %    Lymphocytes % 9.8 13.0 - 44.0 %    Monocytes % 7.3 2.0 - 10.0 %    Eosinophils % 0.8 0.0 - 6.0 %    Basophils % 0.6 0.0 - 2.0 %    Neutrophils Absolute 7.40 1.20 - 7.70 x10*3/uL    Immature Granulocytes Absolute,  Automated 0.02 0.00 - 0.70 x10*3/uL    Lymphocytes Absolute 0.89 (L) 1.20 - 4.80 x10*3/uL    Monocytes Absolute 0.66 0.10 - 1.00 x10*3/uL    Eosinophils Absolute 0.07 0.00 - 0.70 x10*3/uL    Basophils Absolute 0.05 0.00 - 0.10 x10*3/uL   Comprehensive metabolic panel   Result Value Ref Range    Glucose 150 (H) 74 - 99 mg/dL    Sodium 138 136 - 145 mmol/L    Potassium 3.7 3.5 - 5.3 mmol/L    Chloride 102 98 - 107 mmol/L    Bicarbonate 22 21 - 32 mmol/L    Anion Gap 18 10 - 20 mmol/L    Urea Nitrogen 17 6 - 23 mg/dL    Creatinine 1.39 (H) 0.50 - 1.30 mg/dL    eGFR 62 >60 mL/min/1.73m*2    Calcium 8.8 8.6 - 10.3 mg/dL    Albumin 4.5 3.4 - 5.0 g/dL    Alkaline Phosphatase 105 33 - 120 U/L    Total Protein 7.2 6.4 - 8.2 g/dL    AST 25 9 - 39 U/L    Bilirubin, Total 1.9 (H) 0.0 - 1.2 mg/dL    ALT 28 10 - 52 U/L   Magnesium   Result Value Ref Range    Magnesium 1.96 1.60 - 2.40 mg/dL   TSH with reflex to Free T4 if abnormal   Result Value Ref Range    Thyroid Stimulating Hormone 3.02 0.44 - 3.98 mIU/L   Troponin I, High Sensitivity, Initial   Result Value Ref Range    Troponin I, High Sensitivity 610 (HH) 0 - 20 ng/L   Lactate   Result Value Ref Range    Lactate 1.3 0.4 - 2.0 mmol/L   B-Type Natriuretic Peptide   Result Value Ref Range    BNP 1,436 (H) 0 - 99 pg/mL   D-dimer, quantitative   Result Value Ref Range    D-Dimer Non VTE, Quant (ng/mL FEU) 2,261 (H) <=500 ng/mL FEU   Troponin, High Sensitivity, 1 Hour   Result Value Ref Range    Troponin I, High Sensitivity 675 (HH) 0 - 20 ng/L   Sars-CoV-2 PCR   Result Value Ref Range    Coronavirus 2019, PCR Not Detected Not Detected   RSV PCR   Result Value Ref Range    RSV PCR Not Detected Not Detected   Influenza A, and B PCR   Result Value Ref Range    Flu A Result Not Detected Not Detected    Flu B Result Not Detected Not Detected   Troponin I, High Sensitivity   Result Value Ref Range    Troponin I, High Sensitivity 764 (HH) 0 - 20 ng/L   Heparin Assay, UFH   Result  Value Ref Range    Heparin Unfractionated 0.2 See Comment Below for Therapeutic Ranges IU/mL   Basic metabolic panel   Result Value Ref Range    Glucose 147 (H) 74 - 99 mg/dL    Sodium 140 136 - 145 mmol/L    Potassium 3.7 3.5 - 5.3 mmol/L    Chloride 104 98 - 107 mmol/L    Bicarbonate 27 21 - 32 mmol/L    Anion Gap 13 10 - 20 mmol/L    Urea Nitrogen 17 6 - 23 mg/dL    Creatinine 1.29 0.50 - 1.30 mg/dL    eGFR 68 >60 mL/min/1.73m*2    Calcium 8.3 (L) 8.6 - 10.3 mg/dL   CBC and Auto Differential   Result Value Ref Range    WBC 5.8 4.4 - 11.3 x10*3/uL    nRBC 0.0 0.0 - 0.0 /100 WBCs    RBC 5.26 4.50 - 5.90 x10*6/uL    Hemoglobin 14.7 13.5 - 17.5 g/dL    Hematocrit 44.6 41.0 - 52.0 %    MCV 85 80 - 100 fL    MCH 27.9 26.0 - 34.0 pg    MCHC 33.0 32.0 - 36.0 g/dL    RDW 14.1 11.5 - 14.5 %    Platelets 200 150 - 450 x10*3/uL    Neutrophils % 66.3 40.0 - 80.0 %    Immature Granulocytes %, Automated 0.3 0.0 - 0.9 %    Lymphocytes % 18.5 13.0 - 44.0 %    Monocytes % 11.5 2.0 - 10.0 %    Eosinophils % 2.4 0.0 - 6.0 %    Basophils % 1.0 0.0 - 2.0 %    Neutrophils Absolute 3.87 1.20 - 7.70 x10*3/uL    Immature Granulocytes Absolute, Automated 0.02 0.00 - 0.70 x10*3/uL    Lymphocytes Absolute 1.08 (L) 1.20 - 4.80 x10*3/uL    Monocytes Absolute 0.67 0.10 - 1.00 x10*3/uL    Eosinophils Absolute 0.14 0.00 - 0.70 x10*3/uL    Basophils Absolute 0.06 0.00 - 0.10 x10*3/uL   Magnesium   Result Value Ref Range    Magnesium 1.97 1.60 - 2.40 mg/dL   Lipid Panel   Result Value Ref Range    Cholesterol 133 0 - 199 mg/dL    HDL-Cholesterol 29.1 mg/dL    Cholesterol/HDL Ratio 4.6     LDL Calculated 90 <=99 mg/dL    VLDL 14 0 - 40 mg/dL    Triglycerides 70 0 - 149 mg/dL    Non HDL Cholesterol 104 0 - 149 mg/dL   TSH   Result Value Ref Range    Thyroid Stimulating Hormone 5.68 (H) 0.44 - 3.98 mIU/L   Troponin I, High Sensitivity   Result Value Ref Range    Troponin I, High Sensitivity 704 (HH) 0 - 20 ng/L   Heparin Assay, UFH   Result Value Ref  Range    Heparin Unfractionated 0.4 See Comment Below for Therapeutic Ranges IU/mL   Heparin Assay, UFH   Result Value Ref Range    Heparin Unfractionated 0.2 See Comment Below for Therapeutic Ranges IU/mL        CT angio chest for pulmonary embolism   Final Result   1. No acute or chronic pulmonary emboli.   2. No thoracic aortic aneurysm or dissection.   3.  Moderate right and mild left pleural effusion.   4. Mild perihepatic ascites.   Signed by Bernard De La Vega MD      XR chest 2 views   Final Result   Mild pulmonary vascular congestion.   Signed by William Jackson M.D.      Transthoracic Echo (TTE) Complete    (Results Pending)   US renal complete    (Results Pending)       Scheduled medications  furosemide, 20 mg, intravenous, BID  metoprolol tartrate, 25 mg, oral, BID  perflutren lipid microspheres, 0.5-10 mL of dilution, intravenous, Once in imaging  perflutren protein A microsphere, 0.5 mL, intravenous, Once in imaging  sulfur hexafluoride microsphr, 2 mL, intravenous, Once in imaging      Continuous medications  heparin, 0-4,000 Units/hr, Last Rate: 1,500 Units/hr (11/23/24 0121)  oxygen, , Last Rate: Stopped (11/23/24 0425)      PRN medications  PRN medications: heparin, hydrALAZINE, nitroglycerin, ondansetron ODT **OR** ondansetron         Assessment/Plan                  Principal Problem:    Acute congestive heart failure, unspecified heart failure type    Acute CHF  - BNP significantly elevated; pt with symptoms consistent with orthopnea, PND; LE edema  - Congestion seen on CXR  - may be due to years of untreated HTN  - Lasix IV BID  - echocardiogram ordered  - Cardiology consult  - strict I&O, daily weight  - BP control     Elevated Troponin  - has had intermittent chest tightness since July  - no sig CP currrently  - continue to trend troponin  - NPO for possible heart cath  - Cardiology consult  - check lipid profile with am labs, should be fasting if pt has not eaten  - HbA1c     Hypertensive  "urgency/emergency  - elevated troponin, MOSES vs CKD  - Metoprolol, hydralazine prn  - Initial /157, likely ongoing for at least 2 years when informed he had \"very high BP\",which has not been treated, therefore BP reduction has to be gradual.      MOSES vs CKD  - elevated Cr may be chronic due to untreated HTN  - US kidneys     Elevated glucose  - check HbA1c              Santiago Paulson MD      "

## 2024-11-23 NOTE — PROGRESS NOTES
Pharmacy Medication History     Additional concerns with the patient's PTA list.   Per patient no meds or otc items.     The following updates were made to the Prior to Admission medication list:     Source of Information:       Allergy reviewed : Yes    Meds 2 Beds : No    Comments: Per patient no meds      The list below reflectives the updated PTA list. Please review each medication in order reconciliation for additional clarification and justification.    Prior to Admission Medications   Prescriptions Last Dose Informant   oxygen (O2) gas therapy     Sig: Inhale 1 each continuously.      Facility-Administered Medications: None       The list below reflectives the updated allergy list. Please review each documented allergy for additional clarification and justification.    No Known Allergies       11/22/24 at 9:04 PM - Colleen Schneider

## 2024-11-23 NOTE — CONSULTS
"  Inpatient consult to Cardiology  Consult performed by: Adelaida Cadena, APRN-CNP  Consult ordered by: Santiago Paulson MD  Reason for consult: new CHF, NSTEMI        History Of Present Illness:    Polo Orellana is a 49 y.o. male with untreated HTN presenting with sob, cp. Patient was at work yesterday (works as ) when he developed c/o chest tightness, sob after working on a car. No improvement with rest. Due to continued symptoms he decided to go to Urgent Care. Found to be hypoxic 88% on RA and tachycardic at 121. EMS called and transported patient to the ED. Cardiology consulted for \"new CHF, NSTEMI\".       Patient reports that he has had c/o intermittent cp, dry non-productive cough, BLE edema, orthopnea, since July. He has been sleeping in a recliner since July. His symptoms worsened over the past few weeks. New sob with exertion that developed yesterday while working. He endorses abdominal bloating, distention. No LH/dizziness/palpitations/syncope. Denies any recent change in PO intake or any difficulty with bowel, bladder habits. No recent cold/flu like symptoms. No fever, chills, n/v/d.     He has been told his BP has been elevated during at previous medical visits (Urgent Care). He does not follow with a PCP.  Last saw PCP in 2011 for a job physical.       In ED, /RR 20//157  Labs notable for Scr 1.39, glucose 120, total bili 1.9, BNP 1436, D-dimer 2261. HsTrop 610/675/764  CTA chest negative for PE. Moderate right and mild left pleural effusion, mild perihepatic ascites. Chest x-ray with mild pulmonary vascular congestion.   ECG SR with possible LAE, incomplete RBBB, nonspecific ST changes. HR 86, prolonged Qt/Qtc 456 /554 msec Qt/Qtc manually assessed 440 msec/Qtc 479  Given  mg PO, SL NTG 0.4 mg x3 dosese, Morphine 4 mg IVP, Hydralazine 20 mg IVP, Lasix 40 mg IVP, Heparin bolus 4000 units IVP and started on gtt at 1200 units/hr          Tele: SR    Outpatient cardiac " "medications: None    Social history: Hx heavy etoh use (quit 2 weeks ago, prior was drinking 4-5 high beers daily for the past 20+ years ).     Pertinent cardiac family history: Unknown. Mother  in  from cancer. He is unsure of his father or sister's medical history.     Past Cardiology Tests (Last 3 Years):  EKG:ECG SR with possible LAE, incomplete RBBB, nonspecific ST changes. HR 86, prolonged Qt/Qtc 456 /554 msec Qt/Qtc manually assessed 440 msec/Qtc 47  24 1751:             24 1446:                    Results for orders placed in visit on 24    ECG 12 Lead    Narrative  Arrhythmia. Tachy    Echo:  No results found for this or any previous visit.    Ejection Fractions:  No results found for: \"EF\"  Cath:  No results found for this or any previous visit.    Stress Test:  No results found for this or any previous visit.    Cardiac Imaging:  No results found for this or any previous visit.      Past Medical History:  He has no past medical history on file.    Past Surgical History:  He has a past surgical history that includes Other surgical history (2020).      Social History:  He has no history on file for tobacco use, alcohol use, and drug use.    Family History:  No family history on file.     Allergies:  Patient has no known allergies.    ROS:  10 point review of systems including (Constitutional, Eyes, ENMT, Respiratory, Cardiac, Gastrointestinal, Neurological, Psychiatric, and Hematologic) was performed and is otherwise negative.    Objective Data:  Last Recorded Vitals:  Vitals:    24 0425 24 0500 24 0602 24 0648   BP:  (!) 184/134 (!) 185/126 (!) 157/113   BP Location:       Patient Position:       Pulse:  81 86 73   Resp:  16 16 16   Temp:       TempSrc:       SpO2: 95% 96% 96% 94%   Weight:         Medical Gas Therapy: None (Room air)  Weight  Av kg (296 lb 8.3 oz)  Min: 135 kg (296 lb 8.3 oz)  Max: 135 kg (296 lb 8.3 " "oz)      LABS:  CMP:  Results from last 7 days   Lab Units 11/23/24  0512 11/22/24  1519   SODIUM mmol/L 140 138   POTASSIUM mmol/L 3.7 3.7   CHLORIDE mmol/L 104 102   CO2 mmol/L 27 22   ANION GAP mmol/L 13 18   BUN mg/dL 17 17   CREATININE mg/dL 1.29 1.39*   EGFR mL/min/1.73m*2 68 62   MAGNESIUM mg/dL 1.97 1.96   ALBUMIN g/dL  --  4.5   ALT U/L  --  28   AST U/L  --  25   BILIRUBIN TOTAL mg/dL  --  1.9*     CBC:  Results from last 7 days   Lab Units 11/23/24  0512 11/22/24  1519   WBC AUTO x10*3/uL 5.8 9.1   HEMOGLOBIN g/dL 14.7 15.6   HEMATOCRIT % 44.6 47.4   PLATELETS AUTO x10*3/uL 200 205   MCV fL 85 85     COAG:     ABO: No results found for: \"ABO\"  HEME/ENDO:  Results from last 7 days   Lab Units 11/23/24  0512 11/22/24  1519   TSH mIU/L 5.68* 3.02      CARDIAC:   Results from last 7 days   Lab Units 11/22/24  2344 11/22/24  1615 11/22/24  1519   TROPHS ng/L 764* 675* 610*   BNP pg/mL  --  1,436*  --       Results from last 7 days   Lab Units 11/23/24  0512   LDL CALC mg/dL 90   VLDL mg/dL 14   CHOLESTEROL/HDL RATIO  4.6        Last I/O:    Intake/Output Summary (Last 24 hours) at 11/23/2024 1155  Last data filed at 11/23/2024 0623  Gross per 24 hour   Intake --   Output 2750 ml   Net -2750 ml     Net IO Since Admission: -2,750 mL [11/23/24 0816]      Imaging Results:  CT angio chest for pulmonary embolism    Result Date: 11/22/2024  STUDY: CT Angiogram of the Chest; 11/22/2024 6:40 PM. INDICATION: Chest pain.  Elevated d-dimer.  Tachycardia.  Evaluate for pulmonary embolism. COMPARISON: CXR 11/22/2024. ACCESSION NUMBER(S): [id number] ORDERING CLINICIAN: ASHLEE MARINO TECHNIQUE:  CTA of the chest was performed with intravenous contrast. Images are reviewed and processed at a workstation according to the CT angiogram protocol with 3-D and/or MIP post processing imaging generated.  Omnipaque 350 75 mL was administered intravenously. Automated mA/kV exposure control was utilized and patient examination was " performed in strict accordance with principles of ALARA. FINDINGS: Pulmonary arteries are adequately opacified without acute or chronic filling defects.  The thoracic aorta is normal in course and caliber without dissection or aneurysm. The heart is normal in size without pericardial effusion.  Thoracic lymph nodes are not enlarged. There is a right moderate right and mild left pleural effusion.  No pleural thickening, or pneumothorax.  The airways are patent. Lungs are clear without consolidation, interstitial disease, or suspicious nodules. Upper abdomen demonstrates no acute pathology.  There is mild perihepatic ascites. There are no acute fractures.  No suspicious bony lesions.  There are prominent anterior bridging osteophytes at T8-9 and T9-10.  No suspicious bone lesion.    1. No acute or chronic pulmonary emboli. 2. No thoracic aortic aneurysm or dissection. 3.  Moderate right and mild left pleural effusion. 4. Mild perihepatic ascites. Signed by Bernard De La Vega MD    XR chest 2 views    Result Date: 11/22/2024  STUDY: Chest Radiographs;  11/22/2024 3:41PM INDICATION: Chest pain, shortness of breath. Concern for congestive heart failure. COMPARISON: None available. ACCESSION NUMBER(S): [id number] ORDERING CLINICIAN: ASHLEE MARINO TECHNIQUE:  Frontal and lateral chest. FINDINGS: CARDIOMEDIASTINAL SILHOUETTE: There is cardiomegaly  LUNGS: There is mild pulmonary vascular congestion..  ABDOMEN: No remarkable upper abdominal findings.  BONES: No acute osseous changes.    Mild pulmonary vascular congestion. Signed by William Jackson M.D.    ECG 12 Lead    Result Date: 11/22/2024  Arrhythmia. Tachy       Inpatient Medications:  Scheduled medications   Medication Dose Route Frequency    furosemide  20 mg intravenous BID    metoprolol tartrate  25 mg oral BID    perflutren lipid microspheres  0.5-10 mL of dilution intravenous Once in imaging    perflutren protein A microsphere  0.5 mL intravenous Once in imaging  "   sulfur hexafluoride microsphr  2 mL intravenous Once in imaging     PRN medications   Medication    heparin    hydrALAZINE    nitroglycerin    ondansetron ODT    Or    ondansetron     Continuous Medications   Medication Dose Last Rate    heparin  0-4,000 Units/hr 1,500 Units/hr (11/23/24 0121)    oxygen   Stopped (11/23/24 0425)       Outpatient Medications:  Current Outpatient Medications   Medication Instructions    oxygen (O2) gas therapy 1 each, inhalation, Continuous       Physical Exam:  General:  Patient is awake, alert, and oriented.  Patient is in no acute distress.  HEENT:  Pupils equal and reactive.  Normocephalic.  Moist mucosa.    Neck:  JVD elevated to 12 cm.   Cardiovascular:  Regular rate and rhythm.  Normal S1 and S2. No m/r/g.   Pulmonary:  Clear to auscultation bilaterally, diminished in bases.   Abdomen:  Soft. Non-tender. Obese. Non-distended.  Positive bowel sounds.  Lower Extremities:  2+ pedal pulses. BLE edema 2+  Neurologic:  Cranial nerves intact.  No focal deficit.   Skin: Skin warm and dry, normal skin turgor.   Psychiatric: Normal affect.     Assessment/Plan     Polo Orellana is a 49 M with no significant PMHx per patient presenting with sob, cp. He presented to Urgent Care yesterday with c/o cp, sob. Found to be hypoxic 88% on RA and tachycardic at 121. EMS called and transported patient to the ED.   Cardiology consulted for \"new CHF, NSTEMI\".     In ED, /RR 20//157  Labs notable for Scr 1.39, glucose 120, total bili 1.9, BNP 1436, D-dimer 2261. HsTrop 610/675/764  CTA chest negative for PE. Moderate right and mild left pleural effusion, mild perihepatic ascites. Chest x-ray with mild pulmonary vascular congestion.   ECG SR with possible LAE, incomplete RBBB, nonspecific ST changes. HR 86, prolonged Qt/Qtc 456 /554 msec Qt/Qtc manually assessed 440 msec/Qtc 479  Given  mg PO, SL NTG 0.4 mg x3 dosese, Morphine 4 mg IVP, Hydralazine 20 mg IVP, Lasix 40 mg IVP, " "Heparin bolus 4000 units IVP and started on gtt at 1200 units/hr        #HTN emergency. /157 on admit with evidence of renal dysfunction on BMP Scr. 1.39.   BP remains elevated but slowly improving. Not on any home anti-hypotensives prior to admit.   #Elevated Troponin. HsTrop 610/675/764/704. No clear ACS. ECG SR with possible LAE, incomplete RBBB, nonspecific ST changes. HR 86, prolonged Qt/Qtc 456 /554 msec Qt/Qtc manually assessed 440 msec/Qtc 479. Likely 2/2 to type 2 mi demand/ischemia due to htn emergency, acute heart failure vs type 1. Will treat as type 1 until proven otherwise.   #Chest Pain. \"Chest tightness\" with sob likely 2/2 to acute heart failure. Will need ischemic workup at some point pending clinical course.   #Acute Heart Failure. Etiology undetermined. May be hypertensive 2/2 long history of untreated HTN. No prior hx of CHF. Moderately volume overloaded on physical exam. BnP elevated to 1436.   CTA chest negative for PE. Moderate right and mild left pleural effusion, mild perihepatic ascites. Chest x-ray with mild pulmonary vascular congestion.         Plan/Recs:  -Agree with Furosemide 20 mg IV BID. Daily standing weights, 2gm sodium diet, 2L fluid restriction, strict I&Os keep k >4, Mag>2, replace as needed  -TTE as ordered.  Timing of ischemic workup (inpatient vs outpatient) pending results.   -Stop Metoprolol.  -Start Losartan 50 mg daily, Jardiance 10 mg daily, Spironolactone 25 mg daily, ASA 81 mg daily, Atorvastatin 40 mg daily  -Continue Heparin gtt for now     Code Status:  Full Code          Adelaida Cadena, APRN-CNP   "

## 2024-11-23 NOTE — ASSESSMENT & PLAN NOTE
"- BNP significantly elevated; pt with symptoms consistent with orthopnea, PND; LE edema  - Congestion seen on CXR  - may be due to years of untreated HTN  - Lasix IV BID  - echocardiogram ordered  - Cardiology consult  - strict I&O, daily weight  - BP control    Elevated Troponin  - has had intermittent chest tightness since July  - no sig CP currrently  - continue to trend troponin  - NPO for possible heart cath  - Cardiology consult  - check lipid profile with am labs, should be fasting if pt has not eaten  - HbA1c    Hypertensive urgency/emergency  - elevated troponin, MOSES vs CKD  - Metoprolol, hydralazine prn  - Initial /157, likely ongoing for at least 2 years when informed he had \"very high BP\",which has not been treated, therefore BP reduction has to be gradual.     MOSES vs CKD  - elevated Cr may be chronic due to untreated HTN  - US kidneys    Elevated glucose  - check HbA1c  - may have DM given body habitus              "

## 2024-11-23 NOTE — H&P
"History Of Present Illness  Polo Orellana is a 49 y.o. male with a diagnosis of HTN in the past (untreated) presenting with SOB, LE edema since July.    Symptoms seem to have started in July, consisting of SOB, which has worsened to the point of sig SOB with walking 30 feet.   Occ chest tightness  Also developed a dry cough at that time.   He has had orthopnea and PND since then. Used to be able to use one pillow at night to sleep, now sits upright on the couch to sleep, and despite this wakes up gasping for air after sleeping an hour.   Bilateral LE edema since that time, no significant weight gain, however, it has been a while since he weighed himself.     Has occasional palpitations that spontaneously resolve.     Last saw a PCP in 2011 for a job physical.   Was in the ED for unrelated reason 2 years ago, was told his BP was \"really high\". He was prescribed a medication he cannot remember the name of, which he never filled. BP has not been checked since then.     No family history of CAD that he knows of.     In the ED WBC ct normal.   Cr elevated at 1.39, BNP 1436  Glucose elevated at 150  D dimer 2261  Troponin 610 ->675 -> 764  ECG: sinus tachycardia with PACs         Past Medical History  No past medical history on file.    Surgical History  Past Surgical History:   Procedure Laterality Date    OTHER SURGICAL HISTORY  11/20/2020    No history of surgery        Social History  He has no history on file for tobacco use, alcohol use, and drug use.    Family History  No family history on file.     Allergies  Patient has no known allergies.    Review of Systems   Constitutional: Negative.    HENT: Negative.     Eyes: Negative.    Respiratory:  Positive for cough, chest tightness and shortness of breath.    Cardiovascular:  Positive for palpitations and leg swelling.   Gastrointestinal: Negative.  Negative for diarrhea, nausea and vomiting.   Genitourinary: Negative.    Musculoskeletal: Negative.    Skin: " Negative.    Allergic/Immunologic: Negative.    Neurological: Negative.    Hematological: Negative.    Psychiatric/Behavioral: Negative.          Physical Exam  Vitals reviewed.   Constitutional:       Appearance: Normal appearance.      Comments: Obese middle aged male, alert, oriented. Provides detailed history. Does not appear to be in any acute distress.    HENT:      Head: Normocephalic and atraumatic.      Mouth/Throat:      Mouth: Mucous membranes are moist.   Eyes:      Extraocular Movements: Extraocular movements intact.      Conjunctiva/sclera: Conjunctivae normal.      Pupils: Pupils are equal, round, and reactive to light.   Neck:      Comments: Elevated JVP  Cardiovascular:      Rate and Rhythm: Normal rate and regular rhythm.      Pulses: Normal pulses.      Heart sounds: Normal heart sounds.   Pulmonary:      Effort: Pulmonary effort is normal.      Breath sounds: Normal breath sounds.      Comments: Mild bibasilar crackles otherwise clear.   Abdominal:      General: Abdomen is flat. Bowel sounds are normal.      Palpations: Abdomen is soft.   Musculoskeletal:         General: Normal range of motion.      Right lower leg: Edema present.      Left lower leg: Edema present.      Comments: Significant bilateral LE edema   Skin:     General: Skin is warm and dry.   Neurological:      General: No focal deficit present.      Mental Status: He is alert and oriented to person, place, and time.   Psychiatric:         Mood and Affect: Mood normal.         Behavior: Behavior normal.         Thought Content: Thought content normal.         Judgment: Judgment normal.          Last Recorded Vitals  Blood pressure (!) 196/121, pulse 86, temperature 36.3 °C (97.3 °F), temperature source Temporal, resp. rate 20, weight 135 kg (296 lb 8.3 oz).    Relevant Results        Results for orders placed or performed during the hospital encounter of 11/22/24 (from the past 24 hours)   CBC and Auto Differential   Result Value  Ref Range    WBC 9.1 4.4 - 11.3 x10*3/uL    nRBC 0.0 0.0 - 0.0 /100 WBCs    RBC 5.56 4.50 - 5.90 x10*6/uL    Hemoglobin 15.6 13.5 - 17.5 g/dL    Hematocrit 47.4 41.0 - 52.0 %    MCV 85 80 - 100 fL    MCH 28.1 26.0 - 34.0 pg    MCHC 32.9 32.0 - 36.0 g/dL    RDW 13.9 11.5 - 14.5 %    Platelets 205 150 - 450 x10*3/uL    Neutrophils % 81.3 40.0 - 80.0 %    Immature Granulocytes %, Automated 0.2 0.0 - 0.9 %    Lymphocytes % 9.8 13.0 - 44.0 %    Monocytes % 7.3 2.0 - 10.0 %    Eosinophils % 0.8 0.0 - 6.0 %    Basophils % 0.6 0.0 - 2.0 %    Neutrophils Absolute 7.40 1.20 - 7.70 x10*3/uL    Immature Granulocytes Absolute, Automated 0.02 0.00 - 0.70 x10*3/uL    Lymphocytes Absolute 0.89 (L) 1.20 - 4.80 x10*3/uL    Monocytes Absolute 0.66 0.10 - 1.00 x10*3/uL    Eosinophils Absolute 0.07 0.00 - 0.70 x10*3/uL    Basophils Absolute 0.05 0.00 - 0.10 x10*3/uL   Comprehensive metabolic panel   Result Value Ref Range    Glucose 150 (H) 74 - 99 mg/dL    Sodium 138 136 - 145 mmol/L    Potassium 3.7 3.5 - 5.3 mmol/L    Chloride 102 98 - 107 mmol/L    Bicarbonate 22 21 - 32 mmol/L    Anion Gap 18 10 - 20 mmol/L    Urea Nitrogen 17 6 - 23 mg/dL    Creatinine 1.39 (H) 0.50 - 1.30 mg/dL    eGFR 62 >60 mL/min/1.73m*2    Calcium 8.8 8.6 - 10.3 mg/dL    Albumin 4.5 3.4 - 5.0 g/dL    Alkaline Phosphatase 105 33 - 120 U/L    Total Protein 7.2 6.4 - 8.2 g/dL    AST 25 9 - 39 U/L    Bilirubin, Total 1.9 (H) 0.0 - 1.2 mg/dL    ALT 28 10 - 52 U/L   Magnesium   Result Value Ref Range    Magnesium 1.96 1.60 - 2.40 mg/dL   TSH with reflex to Free T4 if abnormal   Result Value Ref Range    Thyroid Stimulating Hormone 3.02 0.44 - 3.98 mIU/L   Troponin I, High Sensitivity, Initial   Result Value Ref Range    Troponin I, High Sensitivity 610 (HH) 0 - 20 ng/L   Lactate   Result Value Ref Range    Lactate 1.3 0.4 - 2.0 mmol/L   B-Type Natriuretic Peptide   Result Value Ref Range    BNP 1,436 (H) 0 - 99 pg/mL   D-dimer, quantitative   Result Value Ref Range     D-Dimer Non VTE, Quant (ng/mL FEU) 2,261 (H) <=500 ng/mL FEU   Troponin, High Sensitivity, 1 Hour   Result Value Ref Range    Troponin I, High Sensitivity 675 (HH) 0 - 20 ng/L   Sars-CoV-2 PCR   Result Value Ref Range    Coronavirus 2019, PCR Not Detected Not Detected   RSV PCR   Result Value Ref Range    RSV PCR Not Detected Not Detected   Influenza A, and B PCR   Result Value Ref Range    Flu A Result Not Detected Not Detected    Flu B Result Not Detected Not Detected   Troponin I, High Sensitivity   Result Value Ref Range    Troponin I, High Sensitivity 764 (HH) 0 - 20 ng/L   Heparin Assay, UFH   Result Value Ref Range    Heparin Unfractionated 0.2 See Comment Below for Therapeutic Ranges IU/mL     CT angio chest for pulmonary embolism    Result Date: 11/22/2024  STUDY: CT Angiogram of the Chest; 11/22/2024 6:40 PM. INDICATION: Chest pain.  Elevated d-dimer.  Tachycardia.  Evaluate for pulmonary embolism. COMPARISON: CXR 11/22/2024. ACCESSION NUMBER(S): [id number] ORDERING CLINICIAN: ASHLEE MARINO TECHNIQUE:  CTA of the chest was performed with intravenous contrast. Images are reviewed and processed at a workstation according to the CT angiogram protocol with 3-D and/or MIP post processing imaging generated.  Omnipaque 350 75 mL was administered intravenously. Automated mA/kV exposure control was utilized and patient examination was performed in strict accordance with principles of ALARA. FINDINGS: Pulmonary arteries are adequately opacified without acute or chronic filling defects.  The thoracic aorta is normal in course and caliber without dissection or aneurysm. The heart is normal in size without pericardial effusion.  Thoracic lymph nodes are not enlarged. There is a right moderate right and mild left pleural effusion.  No pleural thickening, or pneumothorax.  The airways are patent. Lungs are clear without consolidation, interstitial disease, or suspicious nodules. Upper abdomen demonstrates no  "acute pathology.  There is mild perihepatic ascites. There are no acute fractures.  No suspicious bony lesions.  There are prominent anterior bridging osteophytes at T8-9 and T9-10.  No suspicious bone lesion.    1. No acute or chronic pulmonary emboli. 2. No thoracic aortic aneurysm or dissection. 3.  Moderate right and mild left pleural effusion. 4. Mild perihepatic ascites. Signed by Bernard De La Vega MD    XR chest 2 views    Result Date: 11/22/2024  STUDY: Chest Radiographs;  11/22/2024 3:41PM INDICATION: Chest pain, shortness of breath. Concern for congestive heart failure. COMPARISON: None available. ACCESSION NUMBER(S): [id number] ORDERING CLINICIAN: ASHLEE MARINO TECHNIQUE:  Frontal and lateral chest. FINDINGS: CARDIOMEDIASTINAL SILHOUETTE: There is cardiomegaly  LUNGS: There is mild pulmonary vascular congestion..  ABDOMEN: No remarkable upper abdominal findings.  BONES: No acute osseous changes.    Mild pulmonary vascular congestion. Signed by William Jackson M.D.    ECG 12 Lead    Result Date: 11/22/2024  Arrhythmia. Tachy         Assessment/Plan   Assessment & Plan  Acute congestive heart failure, unspecified heart failure type  - BNP significantly elevated; pt with symptoms consistent with orthopnea, PND; LE edema  - Congestion seen on CXR  - may be due to years of untreated HTN  - Lasix IV BID  - echocardiogram ordered  - Cardiology consult  - strict I&O, daily weight  - BP control    Elevated Troponin  - has had intermittent chest tightness since July  - no sig CP currrently  - continue to trend troponin  - NPO for possible heart cath  - Cardiology consult  - check lipid profile with am labs, should be fasting if pt has not eaten  - HbA1c    Hypertensive urgency/emergency  - elevated troponin, MOSES vs CKD  - Metoprolol, hydralazine prn  - Initial /157, likely ongoing for at least 2 years when informed he had \"very high BP\",which has not been treated, therefore BP reduction has to be gradual. "     MOSES vs CKD  - elevated Cr may be chronic due to untreated HTN  - US kidneys    Elevated glucose  - check HbA1c  - may have DM given body habitus                  I spent 65 minutes in the professional and overall care of this patient.      Anh Horan MD

## 2024-11-23 NOTE — CARE PLAN
The patient's goals for the shift include  Remain HDS    The clinical goals for the shift include manage elevated BP  Problem: Pain - Adult  Goal: Verbalizes/displays adequate comfort level or baseline comfort level  Outcome: Progressing     Problem: Safety - Adult  Goal: Free from fall injury  Outcome: Progressing     Problem: Discharge Planning  Goal: Discharge to home or other facility with appropriate resources  Outcome: Progressing     Problem: Chronic Conditions and Co-morbidities  Goal: Patient's chronic conditions and co-morbidity symptoms are monitored and maintained or improved  Outcome: Progressing

## 2024-11-24 VITALS
TEMPERATURE: 98.2 F | BODY MASS INDEX: 42 KG/M2 | RESPIRATION RATE: 18 BRPM | OXYGEN SATURATION: 97 % | WEIGHT: 277.12 LBS | HEIGHT: 68 IN | SYSTOLIC BLOOD PRESSURE: 141 MMHG | HEART RATE: 101 BPM | DIASTOLIC BLOOD PRESSURE: 100 MMHG

## 2024-11-24 LAB
ANION GAP SERPL CALC-SCNC: 13 MMOL/L (ref 10–20)
BUN SERPL-MCNC: 16 MG/DL (ref 6–23)
CALCIUM SERPL-MCNC: 8.6 MG/DL (ref 8.6–10.3)
CHLORIDE SERPL-SCNC: 105 MMOL/L (ref 98–107)
CO2 SERPL-SCNC: 27 MMOL/L (ref 21–32)
CREAT SERPL-MCNC: 1.4 MG/DL (ref 0.5–1.3)
EGFRCR SERPLBLD CKD-EPI 2021: 61 ML/MIN/1.73M*2
GLUCOSE SERPL-MCNC: 110 MG/DL (ref 74–99)
POTASSIUM SERPL-SCNC: 3.3 MMOL/L (ref 3.5–5.3)
SODIUM SERPL-SCNC: 142 MMOL/L (ref 136–145)

## 2024-11-24 PROCEDURE — 2500000001 HC RX 250 WO HCPCS SELF ADMINISTERED DRUGS (ALT 637 FOR MEDICARE OP): Performed by: INTERNAL MEDICINE

## 2024-11-24 PROCEDURE — 2500000004 HC RX 250 GENERAL PHARMACY W/ HCPCS (ALT 636 FOR OP/ED): Performed by: INTERNAL MEDICINE

## 2024-11-24 PROCEDURE — 2500000004 HC RX 250 GENERAL PHARMACY W/ HCPCS (ALT 636 FOR OP/ED): Performed by: HOSPITALIST

## 2024-11-24 PROCEDURE — 80048 BASIC METABOLIC PNL TOTAL CA: CPT | Performed by: INTERNAL MEDICINE

## 2024-11-24 PROCEDURE — 2500000002 HC RX 250 W HCPCS SELF ADMINISTERED DRUGS (ALT 637 FOR MEDICARE OP, ALT 636 FOR OP/ED): Performed by: INTERNAL MEDICINE

## 2024-11-24 PROCEDURE — 2060000001 HC INTERMEDIATE ICU ROOM DAILY

## 2024-11-24 PROCEDURE — 99233 SBSQ HOSP IP/OBS HIGH 50: CPT | Performed by: INTERNAL MEDICINE

## 2024-11-24 PROCEDURE — 99233 SBSQ HOSP IP/OBS HIGH 50: CPT | Performed by: NURSE PRACTITIONER

## 2024-11-24 PROCEDURE — 2500000001 HC RX 250 WO HCPCS SELF ADMINISTERED DRUGS (ALT 637 FOR MEDICARE OP): Performed by: NURSE PRACTITIONER

## 2024-11-24 PROCEDURE — 36415 COLL VENOUS BLD VENIPUNCTURE: CPT | Performed by: INTERNAL MEDICINE

## 2024-11-24 RX ORDER — AMLODIPINE BESYLATE 5 MG/1
5 TABLET ORAL DAILY
Status: DISCONTINUED | OUTPATIENT
Start: 2024-11-24 | End: 2024-11-24

## 2024-11-24 RX ORDER — POTASSIUM CHLORIDE 20 MEQ/1
40 TABLET, EXTENDED RELEASE ORAL 2 TIMES DAILY
Status: COMPLETED | OUTPATIENT
Start: 2024-11-24 | End: 2024-11-24

## 2024-11-24 RX ORDER — AMLODIPINE BESYLATE 5 MG/1
5 TABLET ORAL ONCE
Status: COMPLETED | OUTPATIENT
Start: 2024-11-24 | End: 2024-11-24

## 2024-11-24 RX ORDER — AMLODIPINE BESYLATE 10 MG/1
10 TABLET ORAL DAILY
Status: DISCONTINUED | OUTPATIENT
Start: 2024-11-25 | End: 2024-11-26 | Stop reason: HOSPADM

## 2024-11-24 ASSESSMENT — COGNITIVE AND FUNCTIONAL STATUS - GENERAL
MOBILITY SCORE: 24
DAILY ACTIVITIY SCORE: 24

## 2024-11-24 ASSESSMENT — PAIN SCALES - GENERAL
PAINLEVEL_OUTOF10: 0 - NO PAIN
PAINLEVEL_OUTOF10: 0 - NO PAIN

## 2024-11-24 ASSESSMENT — PAIN SCALES - WONG BAKER: WONGBAKER_NUMERICALRESPONSE: NO HURT

## 2024-11-24 NOTE — CARE PLAN
The patient's goals for the shift include free from injuries    The clinical goals for the shift include patient BP level will be managed throughout shift      Problem: Pain - Adult  Goal: Verbalizes/displays adequate comfort level or baseline comfort level  11/24/2024 0747 by Serina Araiza RN  Outcome: Progressing  11/23/2024 1850 by Serina Araiza RN  Outcome: Progressing     Problem: Safety - Adult  Goal: Free from fall injury  11/24/2024 0747 by Serina Araiza RN  Outcome: Progressing  11/23/2024 1850 by Serina Araiza RN  Outcome: Progressing     Problem: Discharge Planning  Goal: Discharge to home or other facility with appropriate resources  11/24/2024 0747 by Serina Araiza RN  Outcome: Progressing  11/23/2024 1850 by Serina Araiza RN  Outcome: Progressing     Problem: Chronic Conditions and Co-morbidities  Goal: Patient's chronic conditions and co-morbidity symptoms are monitored and maintained or improved  11/24/2024 0747 by Serina Araiza RN  Outcome: Progressing  11/23/2024 1850 by Serina Araiza RN  Outcome: Progressing

## 2024-11-24 NOTE — CARE PLAN
The patient's goals for the shift include free from injuries    The clinical goals for the shift include Manage elevated BP    Over the shift, the patient did not make progress toward the following goals. Barriers to progression include . Recommendations to address these barriers include   Problem: Pain - Adult  Goal: Verbalizes/displays adequate comfort level or baseline comfort level  Outcome: Progressing     Problem: Safety - Adult  Goal: Free from fall injury  Outcome: Progressing   .

## 2024-11-24 NOTE — PROGRESS NOTES
"Subjective Data:  Breathing improved. No cp, LH/dizziness.   BLE edema improved. Urinating frequently per patient.     BP remains uncontrolled.      Objective Data:  Last Recorded Vitals:  Vitals:    24 0756 24 1145 24 1200 24 1546   BP: (!) 196/135 (!) 197/134 (!) 184/128 (!) 188/115   BP Location: Left arm Left arm  Left arm   Patient Position: Sitting Sitting  Sitting   Pulse: 92 99  101   Resp:    Temp: 36.6 °C (97.9 °F) 36.7 °C (98.1 °F)  36.8 °C (98.2 °F)   TempSrc: Temporal Temporal  Temporal   SpO2: 96% 97%     Weight:       Height:         Medical Gas Therapy: None (Room air)  Weight  Av kg (290 lb 6.7 oz)  Min: 126 kg (277 lb 1.9 oz)  Max: 135 kg (297 lb 9.9 oz)    LABS:  CMP:  Results from last 7 days   Lab Units 24  0553 24  0512 24  1519   SODIUM mmol/L 142 140 138   POTASSIUM mmol/L 3.3* 3.7 3.7   CHLORIDE mmol/L 105 104 102   CO2 mmol/L 27 27 22   ANION GAP mmol/L 13 13 18   BUN mg/dL 16 17 17   CREATININE mg/dL 1.40* 1.29 1.39*   EGFR mL/min/1.73m*2 61 68 62   MAGNESIUM mg/dL  --  1.97 1.96   ALBUMIN g/dL  --   --  4.5   ALT U/L  --   --  28   AST U/L  --   --  25   BILIRUBIN TOTAL mg/dL  --   --  1.9*     CBC:  Results from last 7 days   Lab Units 24  0512 24  1519   WBC AUTO x10*3/uL 5.8 9.1   HEMOGLOBIN g/dL 14.7 15.6   HEMATOCRIT % 44.6 47.4   PLATELETS AUTO x10*3/uL 200 205   MCV fL 85 85     COAG:     ABO: No results found for: \"ABO\"  HEME/ENDO:  Results from last 7 days   Lab Units 24  0512 24  1615 24  1519   TSH mIU/L 5.68*  --  3.02   HEMOGLOBIN A1C %  --  7.1*  --       CARDIAC:   Results from last 7 days   Lab Units 24  0512 24  2344 24  1615 24  1519   TROPHS ng/L 704* 764* 675* 610*   BNP pg/mL  --   --  1,436*  --       Results from last 7 days   Lab Units 24  0512 24  1615   HEMOGLOBIN A1C %  --  7.1*   LDL CALC mg/dL 90  --    VLDL mg/dL 14  --    CHOLESTEROL/HDL " RATIO  4.6  --         Last I/O:    Intake/Output Summary (Last 24 hours) at 11/24/2024 1817  Last data filed at 11/24/2024 0100  Gross per 24 hour   Intake --   Output 750 ml   Net -750 ml     Net IO Since Admission: -4,200 mL [11/24/24 1817]      Imaging Results:  US renal complete    Result Date: 11/23/2024  Interpreted By:  Dmitri Arreguin, STUDY: US RENAL COMPLETE;  11/23/2024 6:03 am   INDICATION: Signs/Symptoms:denny.     COMPARISON: None.   ACCESSION NUMBER(S): [id number]   ORDERING CLINICIAN: ARIANNA DUQUE   TECHNIQUE: Multiple images of the kidneys were obtained  .   FINDINGS: RIGHT KIDNEY: The right kidney measures 10.8 cm in length. The renal cortical echogenicity and thickness are within normal limits. No hydronephrosis is present; no evidence of nephrolithiasis. Simple right renal cyst measuring approximately 9 mm in greatest diameter.   LEFT KIDNEY: The left kidney measures 11.4 cm in length. The renal cortical echogenicity and thickness are within normal limits. No hydronephrosis is present; no evidence of nephrolithiasis.   BLADDER: Grossly unremarkable.   Incidental note is made of splenomegaly.       No hydronephrosis.   Incidental note is made of splenomegaly.   MACRO: None   Signed by: Dmitri Arreguin 11/23/2024 11:46 AM Dictation workstation:   [id number]    CT angio chest for pulmonary embolism    Result Date: 11/22/2024  STUDY: CT Angiogram of the Chest; 11/22/2024 6:40 PM. INDICATION: Chest pain.  Elevated d-dimer.  Tachycardia.  Evaluate for pulmonary embolism. COMPARISON: CXR 11/22/2024. ACCESSION NUMBER(S): [id number] ORDERING CLINICIAN: ASHLEE MARINO TECHNIQUE:  CTA of the chest was performed with intravenous contrast. Images are reviewed and processed at a workstation according to the CT angiogram protocol with 3-D and/or MIP post processing imaging generated.  Omnipaque 350 75 mL was administered intravenously. Automated mA/kV exposure control was utilized and patient examination was  performed in strict accordance with principles of ALARA. FINDINGS: Pulmonary arteries are adequately opacified without acute or chronic filling defects.  The thoracic aorta is normal in course and caliber without dissection or aneurysm. The heart is normal in size without pericardial effusion.  Thoracic lymph nodes are not enlarged. There is a right moderate right and mild left pleural effusion.  No pleural thickening, or pneumothorax.  The airways are patent. Lungs are clear without consolidation, interstitial disease, or suspicious nodules. Upper abdomen demonstrates no acute pathology.  There is mild perihepatic ascites. There are no acute fractures.  No suspicious bony lesions.  There are prominent anterior bridging osteophytes at T8-9 and T9-10.  No suspicious bone lesion.    1. No acute or chronic pulmonary emboli. 2. No thoracic aortic aneurysm or dissection. 3.  Moderate right and mild left pleural effusion. 4. Mild perihepatic ascites. Signed by Bernard De La Vega MD    XR chest 2 views    Result Date: 11/22/2024  STUDY: Chest Radiographs;  11/22/2024 3:41PM INDICATION: Chest pain, shortness of breath. Concern for congestive heart failure. COMPARISON: None available. ACCESSION NUMBER(S): [id number] ORDERING CLINICIAN: ASHLEE MARINO TECHNIQUE:  Frontal and lateral chest. FINDINGS: CARDIOMEDIASTINAL SILHOUETTE: There is cardiomegaly  LUNGS: There is mild pulmonary vascular congestion..  ABDOMEN: No remarkable upper abdominal findings.  BONES: No acute osseous changes.    Mild pulmonary vascular congestion. Signed by William Jackson M.D.    ECG 12 Lead    Result Date: 11/22/2024  Arrhythmia. Tachy           Past Cardiology Tests (Last 3 Years):  EKG:ECG SR with possible LAE, incomplete RBBB, nonspecific ST changes. HR 86, prolonged Qt/Qtc 456 /554 msec Qt/Qtc manually assessed 440 msec/Qtc 47  11/22/24 1751:                 11/22/24 1446:                  Echo:  No results found for this or any previous  "visit.     Ejection Fractions:  No results found for: \"EF\"  Cath:  No results found for this or any previous visit.     Stress Test:  No results found for this or any previous visit.     Cardiac Imaging:  No results found for this or any previous visit.     CT Angio Chest 11/22/24:     STUDY:  CT Angiogram of the Chest; 11/22/2024 6:40 PM.  INDICATION:  Chest pain.  Elevated d-dimer.  Tachycardia.  Evaluate for pulmonary  embolism.  COMPARISON:  CXR 11/22/2024.  ACCESSION NUMBER(S):  [id number]  ORDERING CLINICIAN:  ASHLEE MARINO  TECHNIQUE:  CTA of the chest was performed with intravenous contrast.   Images are reviewed and processed at a workstation according to the CT  angiogram protocol with 3-D and/or MIP post processing imaging  generated.  Omnipaque 350 75 mL was administered intravenously.  Automated mA/kV exposure control was utilized and patient examination  was performed in strict accordance with principles of ALARA.  FINDINGS:  Pulmonary arteries are adequately opacified without acute or chronic  filling defects.  The thoracic aorta is normal in course and caliber  without dissection or aneurysm.  The heart is normal in size without pericardial effusion.  Thoracic  lymph nodes are not enlarged.  There is a right moderate right and mild left pleural effusion.  No  pleural thickening, or pneumothorax.  The airways are patent.  Lungs are clear without consolidation, interstitial disease, or  suspicious nodules.  Upper abdomen demonstrates no acute pathology.  There is mild  perihepatic ascites.  There are no acute fractures.  No suspicious bony lesions.  There are  prominent anterior bridging osteophytes at T8-9 and T9-10.  No  suspicious bone lesion.  IMPRESSION:  1. No acute or chronic pulmonary emboli.  2. No thoracic aortic aneurysm or dissection.  3.  Moderate right and mild left pleural effusion.  4. Mild perihepatic ascites.        Inpatient Medications:  Scheduled medications   Medication " "Dose Route Frequency    [START ON 11/25/2024] amLODIPine  10 mg oral Daily    aspirin  81 mg oral Daily    empagliflozin  10 mg oral Daily    enoxaparin  40 mg subcutaneous q24h    furosemide  20 mg intravenous BID    losartan  50 mg oral Daily    [Held by provider] metoprolol tartrate  25 mg oral BID    perflutren lipid microspheres  0.5-10 mL of dilution intravenous Once in imaging    perflutren protein A microsphere  0.5 mL intravenous Once in imaging    potassium chloride CR  40 mEq oral BID    spironolactone  25 mg oral Daily    sulfur hexafluoride microsphr  2 mL intravenous Once in imaging     PRN medications   Medication    hydrALAZINE    nitroglycerin    ondansetron ODT    Or    ondansetron     Continuous Medications   Medication Dose Last Rate    oxygen   Stopped (11/23/24 0425)       Physical Exam:  General:  Patient is awake, alert, and oriented.  Patient is in no acute distress.  HEENT:  Pupils equal and reactive.  Normocephalic.  Moist mucosa.    Neck: +JVD ~10 cm.   Cardiovascular:  Regular rate and rhythm.  Normal S1 and S2. No m/r/g.   Pulmonary:  Clear to auscultation bilaterally.  Abdomen:  Soft. Non-tender.   Non-distended.  Positive bowel sounds.  Lower Extremities:  2+ pedal pulses. No LE edema.  Neurologic:  Cranial nerves intact.  No focal deficit.   Skin: Skin warm and dry, normal skin turgor.   Psychiatric: Normal affect.     Assessment/Plan   Polo Orellana is a 49 M with no significant PMHx per patient presenting with sob, cp. He presented to Urgent Care yesterday with c/o cp, sob. Found to be hypoxic 88% on RA and tachycardic at 121. EMS called and transported patient to the ED.   Cardiology consulted for \"new CHF, NSTEMI\".      #HTN emergency. /157 on admit with evidence of renal dysfunction on BMP Scr. 1.39, MOSES vs CKD.   Not on any home anti-hypotensives prior to admit. Started on Losartan, Spironlactone.   11/24/24: BP remains uncontrolled, starting Amlodipine. IV Hydralazine " "prn     #Elevated Troponin. HsTrop 610/675/764/704. No clear ACS. ECG SR with possible LAE, incomplete RBBB, nonspecific ST changes. HR 86, prolonged Qt/Qtc 456 /554 msec Qt/Qtc manually assessed 440 msec/Qtc 479.  Non-MI troponin elevation / acute non-traumatic myocardial injury in the setting of htn emergency, acute heart failure. Core Measures do not apply. Initially started on Heparin gtt which has since been discontinued.     #Chest Pain. \"Chest tightness\" with sob likely 2/2 to acute heart failure. Will need ischemic workup at some point pending clinical course.     #Acute Heart Failure. Etiology undetermined. May be hypertensive 2/2 long history of untreated HTN vs ischemic. Will need ischemic workup at some point pending clinical course. No prior hx of CHF. Moderately volume overloaded on physical exam. BnP elevated to 1436.   CTA chest negative for PE. Moderate right and mild left pleural effusion, mild perihepatic ascites. Chest x-ray with mild pulmonary vascular congestion. IV diuresis. Started on Losartan, Jardiance. Hold beta blockers for now pending TTE and euvolemia.   11/24/24: Diuresing.  Negative 4.2 L since admit. Remains mildly volume up on exam, continue IV diuresis with Furosemide 20 mg IVP        Plan/Recs:  -Continue Furosemide 20 mg IV BID. Daily standing weights, 2gm sodium diet, 2L fluid restriction, strict I&Os keep k >4, Mag>2, replace as needed  -TTE as ordered.  Timing of ischemic workup (inpatient vs outpatient) pending results.   -Start Amlodipine 10 mg daily.   -Continue Losartan 50 mg daily, Jardiance 10 mg daily, Spironolactone 25 mg daily, ASA 81 mg daily, Atorvastatin 40 mg daily  -Hold beta blockers for now pending TTE and euvolemia.     Code Status:  Full Code            Adelaida Cadena, APRN-CNP    "

## 2024-11-24 NOTE — PROGRESS NOTES
Polo Orellana is a 49 y.o. male on day 2 of admission presenting with Acute congestive heart failure, unspecified heart failure type.      Subjective   Pt seen and examined.        Objective     Last Recorded Vitals  BP (!) 196/135 (BP Location: Left arm, Patient Position: Sitting)   Pulse 92   Temp 36.6 °C (97.9 °F) (Temporal)   Resp 22   Wt 126 kg (277 lb 1.9 oz)   SpO2 96%   Intake/Output last 3 Shifts:    Intake/Output Summary (Last 24 hours) at 11/24/2024 1116  Last data filed at 11/24/2024 0100  Gross per 24 hour   Intake --   Output 1450 ml   Net -1450 ml       Admission Weight  Weight: 135 kg (296 lb 8.3 oz) (11/22/24 1708)    Daily Weight  11/24/24 : 126 kg (277 lb 1.9 oz)      Physical Exam  Constitutional: No acute distress, awake, alert  Head/Neck: Neck supple  Respiratory/Thorax: Lungs are Clear to auscultation  Cardiovascular: Regular, rate and rhythm,  2+ equal pulses of the extremities, normal S 1and S 2  Gastrointestinal: Nondistended, soft, non-tender, no rebound tenderness or guarding  Extremities: BLE edema  Neurological: Awake and alert. No focal neurological deficits  Psychological: Appropriate mood and behavior    Relevant Results  Results for orders placed or performed during the hospital encounter of 11/22/24 (from the past 24 hours)   Basic Metabolic Panel   Result Value Ref Range    Glucose 110 (H) 74 - 99 mg/dL    Sodium 142 136 - 145 mmol/L    Potassium 3.3 (L) 3.5 - 5.3 mmol/L    Chloride 105 98 - 107 mmol/L    Bicarbonate 27 21 - 32 mmol/L    Anion Gap 13 10 - 20 mmol/L    Urea Nitrogen 16 6 - 23 mg/dL    Creatinine 1.40 (H) 0.50 - 1.30 mg/dL    eGFR 61 >60 mL/min/1.73m*2    Calcium 8.6 8.6 - 10.3 mg/dL        US renal complete   Final Result   No hydronephrosis.        Incidental note is made of splenomegaly.        MACRO:   None        Signed by: Dmitri Arreguin 11/23/2024 11:46 AM   Dictation workstation:   [id number]      CT angio chest for pulmonary embolism   Final Result   1.  No acute or chronic pulmonary emboli.   2. No thoracic aortic aneurysm or dissection.   3.  Moderate right and mild left pleural effusion.   4. Mild perihepatic ascites.   Signed by Bernard De La Vega MD      XR chest 2 views   Final Result   Mild pulmonary vascular congestion.   Signed by William Jackson M.D.      Transthoracic Echo (TTE) Complete    (Results Pending)       Scheduled medications  amLODIPine, 5 mg, oral, Daily  aspirin, 81 mg, oral, Daily  empagliflozin, 10 mg, oral, Daily  enoxaparin, 40 mg, subcutaneous, q24h  furosemide, 20 mg, intravenous, BID  losartan, 50 mg, oral, Daily  [Held by provider] metoprolol tartrate, 25 mg, oral, BID  perflutren lipid microspheres, 0.5-10 mL of dilution, intravenous, Once in imaging  perflutren protein A microsphere, 0.5 mL, intravenous, Once in imaging  potassium chloride CR, 40 mEq, oral, BID  spironolactone, 25 mg, oral, Daily  sulfur hexafluoride microsphr, 2 mL, intravenous, Once in imaging      Continuous medications  oxygen, , Last Rate: Stopped (11/23/24 0425)      PRN medications  PRN medications: hydrALAZINE, nitroglycerin, ondansetron ODT **OR** ondansetron         Assessment/Plan   This patient currently has cardiac telemetry ordered; if you would like to modify or discontinue the telemetry order, click here to go to the orders activity to modify/discontinue the order.              Principal Problem:    Acute congestive heart failure, unspecified heart failure type    Acute CHF  - BNP significantly elevated; pt with symptoms consistent with orthopnea, PND; LE edema  - Congestion seen on CXR  - may be due to years of untreated HTN  - Lasix IV BID  - echocardiogram ordered  - Cardiology consult  - strict I&O, daily weight  - Start Losartan 50 mg daily, Jardiance 10 mg daily, Spironolactone 25 mg daily, ASA 81 mg daily, Atorvastatin 40 mg daily      Elevated Troponin  - has had intermittent chest tightness since July  - no sig CP currrently  - continue to trend  troponin  - Cardiology consult  - check lipid profile with am labs, should be fasting if pt has not eaten    DM  -A1C 7.1  -will start metformin on discharge     Hypertensive urgency/emergency  - elevated troponin, MOSES vs CKD  - Start Losartan 50 mg daily, Spironolactone 25 mg daily     MOSES vs CKD  - elevated Cr may be chronic due to untreated HTN  - US kidneys with no acute findings              Santiago Paulson MD       None

## 2024-11-25 ENCOUNTER — APPOINTMENT (OUTPATIENT)
Dept: CARDIOLOGY | Facility: HOSPITAL | Age: 49
End: 2024-11-25

## 2024-11-25 LAB
ANION GAP SERPL CALC-SCNC: 14 MMOL/L (ref 10–20)
AORTIC VALVE MEAN GRADIENT: 7 MMHG
AORTIC VALVE PEAK VELOCITY: 1.85 M/S
ATRIAL RATE: 116 BPM
ATRIAL RATE: 82 BPM
ATRIAL RATE: 86 BPM
AV PEAK GRADIENT: 14 MMHG
AVA (PEAK VEL): 2.82 CM2
AVA (VTI): 3.14 CM2
BUN SERPL-MCNC: 16 MG/DL (ref 6–23)
CALCIUM SERPL-MCNC: 8.7 MG/DL (ref 8.6–10.3)
CHLORIDE SERPL-SCNC: 108 MMOL/L (ref 98–107)
CO2 SERPL-SCNC: 23 MMOL/L (ref 21–32)
CREAT SERPL-MCNC: 1.25 MG/DL (ref 0.5–1.3)
EGFRCR SERPLBLD CKD-EPI 2021: 70 ML/MIN/1.73M*2
EJECTION FRACTION APICAL 4 CHAMBER: 41.1
EJECTION FRACTION: 43 %
ERYTHROCYTE [DISTWIDTH] IN BLOOD BY AUTOMATED COUNT: 13.9 % (ref 11.5–14.5)
GLOBAL LONGITUDINAL STRAIN: 8.4 %
GLUCOSE SERPL-MCNC: 108 MG/DL (ref 74–99)
HCT VFR BLD AUTO: 45.2 % (ref 41–52)
HGB BLD-MCNC: 14.8 G/DL (ref 13.5–17.5)
LEFT ATRIUM VOLUME AREA LENGTH INDEX BSA: 48.9 ML/M2
LEFT VENTRICLE INTERNAL DIMENSION DIASTOLE: 5.91 CM (ref 3.5–6)
LEFT VENTRICULAR OUTFLOW TRACT DIAMETER: 2.16 CM
MCH RBC QN AUTO: 28.6 PG (ref 26–34)
MCHC RBC AUTO-ENTMCNC: 32.7 G/DL (ref 32–36)
MCV RBC AUTO: 87 FL (ref 80–100)
MITRAL VALVE E/A RATIO: 2.33
NRBC BLD-RTO: 0 /100 WBCS (ref 0–0)
P AXIS: 53 DEGREES
P AXIS: 61 DEGREES
P AXIS: 65 DEGREES
P OFFSET: 202 MS
P OFFSET: 204 MS
P OFFSET: 210 MS
P ONSET: 140 MS
P ONSET: 147 MS
P ONSET: 151 MS
PLATELET # BLD AUTO: 202 X10*3/UL (ref 150–450)
POTASSIUM SERPL-SCNC: 3.8 MMOL/L (ref 3.5–5.3)
PR INTERVAL: 142 MS
PR INTERVAL: 154 MS
PR INTERVAL: 160 MS
Q ONSET: 220 MS
Q ONSET: 222 MS
Q ONSET: 224 MS
QRS COUNT: 13 BEATS
QRS COUNT: 14 BEATS
QRS COUNT: 19 BEATS
QRS DURATION: 100 MS
QRS DURATION: 100 MS
QRS DURATION: 94 MS
QT INTERVAL: 358 MS
QT INTERVAL: 456 MS
QT INTERVAL: 458 MS
QTC CALCULATION(BAZETT): 497 MS
QTC CALCULATION(BAZETT): 535 MS
QTC CALCULATION(BAZETT): 545 MS
QTC FREDERICIA: 446 MS
QTC FREDERICIA: 508 MS
QTC FREDERICIA: 514 MS
R AXIS: -25 DEGREES
R AXIS: -34 DEGREES
R AXIS: -8 DEGREES
RBC # BLD AUTO: 5.18 X10*6/UL (ref 4.5–5.9)
RIGHT VENTRICLE FREE WALL PEAK S': 13 CM/S
RIGHT VENTRICLE PEAK SYSTOLIC PRESSURE: 40.3 MMHG
SODIUM SERPL-SCNC: 141 MMOL/L (ref 136–145)
T AXIS: -87 DEGREES
T AXIS: 14 DEGREES
T AXIS: 91 DEGREES
T OFFSET: 401 MS
T OFFSET: 449 MS
T OFFSET: 452 MS
TRICUSPID ANNULAR PLANE SYSTOLIC EXCURSION: 2.3 CM
VENTRICULAR RATE: 116 BPM
VENTRICULAR RATE: 82 BPM
VENTRICULAR RATE: 86 BPM
WBC # BLD AUTO: 5.5 X10*3/UL (ref 4.4–11.3)

## 2024-11-25 PROCEDURE — 93306 TTE W/DOPPLER COMPLETE: CPT | Performed by: INTERNAL MEDICINE

## 2024-11-25 PROCEDURE — 36415 COLL VENOUS BLD VENIPUNCTURE: CPT | Performed by: INTERNAL MEDICINE

## 2024-11-25 PROCEDURE — 2500000002 HC RX 250 W HCPCS SELF ADMINISTERED DRUGS (ALT 637 FOR MEDICARE OP, ALT 636 FOR OP/ED): Performed by: INTERNAL MEDICINE

## 2024-11-25 PROCEDURE — 93005 ELECTROCARDIOGRAM TRACING: CPT

## 2024-11-25 PROCEDURE — 99233 SBSQ HOSP IP/OBS HIGH 50: CPT | Performed by: INTERNAL MEDICINE

## 2024-11-25 PROCEDURE — 2500000001 HC RX 250 WO HCPCS SELF ADMINISTERED DRUGS (ALT 637 FOR MEDICARE OP): Performed by: INTERNAL MEDICINE

## 2024-11-25 PROCEDURE — 2060000001 HC INTERMEDIATE ICU ROOM DAILY

## 2024-11-25 PROCEDURE — 85027 COMPLETE CBC AUTOMATED: CPT | Performed by: INTERNAL MEDICINE

## 2024-11-25 PROCEDURE — C8929 TTE W OR WO FOL WCON,DOPPLER: HCPCS

## 2024-11-25 PROCEDURE — 2500000004 HC RX 250 GENERAL PHARMACY W/ HCPCS (ALT 636 FOR OP/ED): Performed by: INTERNAL MEDICINE

## 2024-11-25 PROCEDURE — 2500000004 HC RX 250 GENERAL PHARMACY W/ HCPCS (ALT 636 FOR OP/ED): Performed by: HOSPITALIST

## 2024-11-25 PROCEDURE — 2500000001 HC RX 250 WO HCPCS SELF ADMINISTERED DRUGS (ALT 637 FOR MEDICARE OP): Performed by: NURSE PRACTITIONER

## 2024-11-25 PROCEDURE — 99233 SBSQ HOSP IP/OBS HIGH 50: CPT | Performed by: NURSE PRACTITIONER

## 2024-11-25 PROCEDURE — 80048 BASIC METABOLIC PNL TOTAL CA: CPT | Performed by: INTERNAL MEDICINE

## 2024-11-25 RX ORDER — HYDRALAZINE HYDROCHLORIDE 20 MG/ML
10 INJECTION INTRAMUSCULAR; INTRAVENOUS ONCE
Status: COMPLETED | OUTPATIENT
Start: 2024-11-25 | End: 2024-11-25

## 2024-11-25 RX ORDER — FUROSEMIDE 40 MG/1
40 TABLET ORAL DAILY
Status: DISCONTINUED | OUTPATIENT
Start: 2024-11-26 | End: 2024-11-26 | Stop reason: HOSPADM

## 2024-11-25 RX ORDER — CARVEDILOL 12.5 MG/1
12.5 TABLET ORAL 2 TIMES DAILY
Status: DISCONTINUED | OUTPATIENT
Start: 2024-11-25 | End: 2024-11-26 | Stop reason: HOSPADM

## 2024-11-25 SDOH — ECONOMIC STABILITY: HOUSING INSECURITY: IN THE LAST 12 MONTHS, WAS THERE A TIME WHEN YOU WERE NOT ABLE TO PAY THE MORTGAGE OR RENT ON TIME?: NO

## 2024-11-25 SDOH — ECONOMIC STABILITY: TRANSPORTATION INSECURITY: IN THE PAST 12 MONTHS, HAS LACK OF TRANSPORTATION KEPT YOU FROM MEDICAL APPOINTMENTS OR FROM GETTING MEDICATIONS?: NO

## 2024-11-25 SDOH — HEALTH STABILITY: PHYSICAL HEALTH: ON AVERAGE, HOW MANY DAYS PER WEEK DO YOU ENGAGE IN MODERATE TO STRENUOUS EXERCISE (LIKE A BRISK WALK)?: 0 DAYS

## 2024-11-25 SDOH — HEALTH STABILITY: PHYSICAL HEALTH: ON AVERAGE, HOW MANY MINUTES DO YOU ENGAGE IN EXERCISE AT THIS LEVEL?: 0 MIN

## 2024-11-25 SDOH — ECONOMIC STABILITY: HOUSING INSECURITY: AT ANY TIME IN THE PAST 12 MONTHS, WERE YOU HOMELESS OR LIVING IN A SHELTER (INCLUDING NOW)?: NO

## 2024-11-25 SDOH — ECONOMIC STABILITY: HOUSING INSECURITY: IN THE PAST 12 MONTHS, HOW MANY TIMES HAVE YOU MOVED WHERE YOU WERE LIVING?: 0

## 2024-11-25 ASSESSMENT — COGNITIVE AND FUNCTIONAL STATUS - GENERAL
DAILY ACTIVITIY SCORE: 24
MOBILITY SCORE: 24
MOBILITY SCORE: 24
DAILY ACTIVITIY SCORE: 24

## 2024-11-25 ASSESSMENT — PAIN SCALES - GENERAL
PAINLEVEL_OUTOF10: 0 - NO PAIN

## 2024-11-25 ASSESSMENT — PAIN - FUNCTIONAL ASSESSMENT
PAIN_FUNCTIONAL_ASSESSMENT: 0-10
PAIN_FUNCTIONAL_ASSESSMENT: 0-10

## 2024-11-25 ASSESSMENT — PAIN SCALES - WONG BAKER: WONGBAKER_NUMERICALRESPONSE: NO HURT

## 2024-11-25 ASSESSMENT — ACTIVITIES OF DAILY LIVING (ADL): LACK_OF_TRANSPORTATION: NO

## 2024-11-25 NOTE — CARE PLAN
Problem: Pain - Adult  Goal: Verbalizes/displays adequate comfort level or baseline comfort level  Outcome: Progressing     Problem: Safety - Adult  Goal: Free from fall injury  Outcome: Progressing     Problem: Discharge Planning  Goal: Discharge to home or other facility with appropriate resources  Outcome: Progressing     Problem: Chronic Conditions and Co-morbidities  Goal: Patient's chronic conditions and co-morbidity symptoms are monitored and maintained or improved  Outcome: Progressing   The patient's goals for the shift include free from injuries    The clinical goals for the shift include pt. will have controlled HTN this shift    Over the shift, the patient did not make progress toward the following goals. Barriers to progression include . Recommendations to address these barriers include .

## 2024-11-25 NOTE — PROGRESS NOTES
Polo Orellaan is a 49 y.o. male on day 3 of admission presenting with Acute congestive heart failure, unspecified heart failure type.      Subjective   Pt seen and examined.        Objective     Last Recorded Vitals  BP (!) 183/124 (BP Location: Left arm, Patient Position: Lying) Comment: MD notified  Pulse 98   Temp 36.2 °C (97.2 °F) (Temporal)   Resp 18   Wt 120 kg (264 lb 9.6 oz)   SpO2 98%   Intake/Output last 3 Shifts:    Intake/Output Summary (Last 24 hours) at 11/25/2024 1004  Last data filed at 11/25/2024 0936  Gross per 24 hour   Intake 240 ml   Output --   Net 240 ml       Admission Weight  Weight: 135 kg (296 lb 8.3 oz) (11/22/24 1708)    Daily Weight  11/25/24 : 120 kg (264 lb 9.6 oz)      Physical Exam  Constitutional: No acute distress, awake, alert  Head/Neck: Neck supple  Respiratory/Thorax: Lungs are Clear to auscultation  Cardiovascular: Regular, rate and rhythm,  2+ equal pulses of the extremities, normal S 1and S 2  Gastrointestinal: Nondistended, soft, non-tender, no rebound tenderness or guarding  Extremities: BLE edema  Neurological: Awake and alert. No focal neurological deficits  Psychological: Appropriate mood and behavior    Relevant Results  Results for orders placed or performed during the hospital encounter of 11/22/24 (from the past 24 hours)   CBC   Result Value Ref Range    WBC 5.5 4.4 - 11.3 x10*3/uL    nRBC 0.0 0.0 - 0.0 /100 WBCs    RBC 5.18 4.50 - 5.90 x10*6/uL    Hemoglobin 14.8 13.5 - 17.5 g/dL    Hematocrit 45.2 41.0 - 52.0 %    MCV 87 80 - 100 fL    MCH 28.6 26.0 - 34.0 pg    MCHC 32.7 32.0 - 36.0 g/dL    RDW 13.9 11.5 - 14.5 %    Platelets 202 150 - 450 x10*3/uL   Basic Metabolic Panel   Result Value Ref Range    Glucose 108 (H) 74 - 99 mg/dL    Sodium 141 136 - 145 mmol/L    Potassium 3.8 3.5 - 5.3 mmol/L    Chloride 108 (H) 98 - 107 mmol/L    Bicarbonate 23 21 - 32 mmol/L    Anion Gap 14 10 - 20 mmol/L    Urea Nitrogen 16 6 - 23 mg/dL    Creatinine 1.25 0.50 - 1.30  mg/dL    eGFR 70 >60 mL/min/1.73m*2    Calcium 8.7 8.6 - 10.3 mg/dL        US renal complete   Final Result   No hydronephrosis.        Incidental note is made of splenomegaly.        MACRO:   None        Signed by: Dmitri Arreguin 11/23/2024 11:46 AM   Dictation workstation:   [id number]      CT angio chest for pulmonary embolism   Final Result   1. No acute or chronic pulmonary emboli.   2. No thoracic aortic aneurysm or dissection.   3.  Moderate right and mild left pleural effusion.   4. Mild perihepatic ascites.   Signed by Bernard eD La Vega MD      XR chest 2 views   Final Result   Mild pulmonary vascular congestion.   Signed by William Jackson M.D.      Transthoracic Echo (TTE) Complete    (Results Pending)       Scheduled medications  amLODIPine, 10 mg, oral, Daily  aspirin, 81 mg, oral, Daily  empagliflozin, 10 mg, oral, Daily  enoxaparin, 40 mg, subcutaneous, q24h  furosemide, 20 mg, intravenous, BID  losartan, 50 mg, oral, Daily  [Held by provider] metoprolol tartrate, 25 mg, oral, BID  perflutren lipid microspheres, 0.5-10 mL of dilution, intravenous, Once in imaging  perflutren protein A microsphere, 0.5 mL, intravenous, Once in imaging  spironolactone, 25 mg, oral, Daily  sulfur hexafluoride microsphr, 2 mL, intravenous, Once in imaging      Continuous medications  oxygen, , Last Rate: Stopped (11/23/24 0425)      PRN medications  PRN medications: hydrALAZINE, nitroglycerin, ondansetron ODT **OR** ondansetron         Assessment/Plan   This patient currently has cardiac telemetry ordered; if you would like to modify or discontinue the telemetry order, click here to go to the orders activity to modify/discontinue the order.              Principal Problem:    Acute congestive heart failure, unspecified heart failure type    Acute CHF  - BNP significantly elevated; pt with symptoms consistent with orthopnea, PND; LE edema  - Congestion seen on CXR  - may be due to years of untreated HTN  - Lasix IV BID  -  echocardiogram ordered  - Cardiology consult  - strict I&O, daily weight  - Start Losartan 50 mg daily, Jardiance 10 mg daily, Spironolactone 25 mg daily, ASA 81 mg daily, Atorvastatin 40 mg daily      Elevated Troponin  - has had intermittent chest tightness since July  - no sig SHEREE agrawal  - Cardiology consult    DM  -A1C 7.1  -will start metformin on discharge     Hypertensive urgency/emergency  - elevated troponin, MOSES vs CKD  - Start Losartan 50 mg daily, Spironolactone 25 mg daily     MOSES vs CKD  - elevated Cr may be chronic due to untreated HTN  - US kidneys with no acute findings              Santiago Paulson MD

## 2024-11-25 NOTE — PROGRESS NOTES
"Subjective/Objective Data:  Pending TTE.     TTE 24: LVEF 40-45%, global HK, pseudonormal diastolic filling, mildly dilated LV, LVIDd 5.91 cm, IVSd 1.3 cm, severe concentric LVH, moderately enlarged RV size, normal RV fx, LA sev dilated, RA mod dilated, mild pHTN, RVSP 40.3 mmHg     BP remains uncontrolled.     Subjectively diuresing. I&O inaccurate.     Feels better. No further sob, BACH. No cp. BLE swelling much improved.     Last Recorded Vitals:  Vitals:    24 0000 24 0400 24 0600 24 0824   BP: 135/81 (!) 177/132  (!) 183/124   BP Location: Right arm Left arm  Left arm   Patient Position: Lying   Lying   Pulse:    98   Resp: 18 18  18   Temp: 36.4 °C (97.5 °F) 36.4 °C (97.5 °F)  36.2 °C (97.2 °F)   TempSrc: Temporal Temporal  Temporal   SpO2: 95% 98%  98%   Weight:   120 kg (264 lb 9.6 oz)    Height:         Medical Gas Therapy: None (Room air)  Weight  Av kg (283 lb 15.4 oz)  Min: 120 kg (264 lb 9.6 oz)  Max: 135 kg (297 lb 9.9 oz)    LABS:  CMP:  Results from last 7 days   Lab Units 24  0554 24  0553 24  0512 24  1519   SODIUM mmol/L 141 142 140 138   POTASSIUM mmol/L 3.8 3.3* 3.7 3.7   CHLORIDE mmol/L 108* 105 104 102   CO2 mmol/L 23 27 27 22   ANION GAP mmol/L 14 13 13 18   BUN mg/dL 16 16 17 17   CREATININE mg/dL 1.25 1.40* 1.29 1.39*   EGFR mL/min/1.73m*2 70 61 68 62   MAGNESIUM mg/dL  --   --  1.97 1.96   ALBUMIN g/dL  --   --   --  4.5   ALT U/L  --   --   --  28   AST U/L  --   --   --  25   BILIRUBIN TOTAL mg/dL  --   --   --  1.9*     CBC:  Results from last 7 days   Lab Units 24  0554 24  0512 24  1519   WBC AUTO x10*3/uL 5.5 5.8 9.1   HEMOGLOBIN g/dL 14.8 14.7 15.6   HEMATOCRIT % 45.2 44.6 47.4   PLATELETS AUTO x10*3/uL 202 200 205   MCV fL 87 85 85     COAG:     ABO: No results found for: \"ABO\"  HEME/ENDO:  Results from last 7 days   Lab Units 24  0512 24  1615 24  1519   TSH mIU/L 5.68*  --  3.02 "   HEMOGLOBIN A1C %  --  7.1*  --       CARDIAC:   Results from last 7 days   Lab Units 11/23/24  0512 11/22/24  2344 11/22/24  1615 11/22/24  1519   TROPHS ng/L 704* 764* 675* 610*   BNP pg/mL  --   --  1,436*  --       Results from last 7 days   Lab Units 11/23/24  0512 11/22/24  1615   HEMOGLOBIN A1C %  --  7.1*   LDL CALC mg/dL 90  --    VLDL mg/dL 14  --    CHOLESTEROL/HDL RATIO  4.6  --         Last I/O:    Intake/Output Summary (Last 24 hours) at 11/25/2024 1224  Last data filed at 11/25/2024 0936  Gross per 24 hour   Intake 240 ml   Output --   Net 240 ml     Net IO Since Admission: -3,960 mL [11/25/24 1224]      Imaging Results:  US renal complete    Result Date: 11/23/2024  Interpreted By:  Dmitri Arreguin, STUDY: US RENAL COMPLETE;  11/23/2024 6:03 am   INDICATION: Signs/Symptoms:denny.     COMPARISON: None.   ACCESSION NUMBER(S): [id number]   ORDERING CLINICIAN: ARIANNA DUQUE   TECHNIQUE: Multiple images of the kidneys were obtained  .   FINDINGS: RIGHT KIDNEY: The right kidney measures 10.8 cm in length. The renal cortical echogenicity and thickness are within normal limits. No hydronephrosis is present; no evidence of nephrolithiasis. Simple right renal cyst measuring approximately 9 mm in greatest diameter.   LEFT KIDNEY: The left kidney measures 11.4 cm in length. The renal cortical echogenicity and thickness are within normal limits. No hydronephrosis is present; no evidence of nephrolithiasis.   BLADDER: Grossly unremarkable.   Incidental note is made of splenomegaly.       No hydronephrosis.   Incidental note is made of splenomegaly.   MACRO: None   Signed by: Dmitri Arreguin 11/23/2024 11:46 AM Dictation workstation:   [id number]    CT angio chest for pulmonary embolism    Result Date: 11/22/2024  STUDY: CT Angiogram of the Chest; 11/22/2024 6:40 PM. INDICATION: Chest pain.  Elevated d-dimer.  Tachycardia.  Evaluate for pulmonary embolism. COMPARISON: CXR 11/22/2024. ACCESSION NUMBER(S): [id number]  ORDERING CLINICIAN: ASHLEE MARINO TECHNIQUE:  CTA of the chest was performed with intravenous contrast. Images are reviewed and processed at a workstation according to the CT angiogram protocol with 3-D and/or MIP post processing imaging generated.  Omnipaque 350 75 mL was administered intravenously. Automated mA/kV exposure control was utilized and patient examination was performed in strict accordance with principles of ALARA. FINDINGS: Pulmonary arteries are adequately opacified without acute or chronic filling defects.  The thoracic aorta is normal in course and caliber without dissection or aneurysm. The heart is normal in size without pericardial effusion.  Thoracic lymph nodes are not enlarged. There is a right moderate right and mild left pleural effusion.  No pleural thickening, or pneumothorax.  The airways are patent. Lungs are clear without consolidation, interstitial disease, or suspicious nodules. Upper abdomen demonstrates no acute pathology.  There is mild perihepatic ascites. There are no acute fractures.  No suspicious bony lesions.  There are prominent anterior bridging osteophytes at T8-9 and T9-10.  No suspicious bone lesion.    1. No acute or chronic pulmonary emboli. 2. No thoracic aortic aneurysm or dissection. 3.  Moderate right and mild left pleural effusion. 4. Mild perihepatic ascites. Signed by Bernard De La Vega MD    XR chest 2 views    Result Date: 11/22/2024  STUDY: Chest Radiographs;  11/22/2024 3:41PM INDICATION: Chest pain, shortness of breath. Concern for congestive heart failure. COMPARISON: None available. ACCESSION NUMBER(S): [id number] ORDERING CLINICIAN: ASHLEE MARINO TECHNIQUE:  Frontal and lateral chest. FINDINGS: CARDIOMEDIASTINAL SILHOUETTE: There is cardiomegaly  LUNGS: There is mild pulmonary vascular congestion..  ABDOMEN: No remarkable upper abdominal findings.  BONES: No acute osseous changes.    Mild pulmonary vascular congestion. Signed by William Jackson  "M.D.    ECG 12 Lead    Result Date: 11/22/2024  Arrhythmia. Tachy           Past Cardiology Tests (Last 3 Years):  EKG:ECG SR with possible LAE, incomplete RBBB, nonspecific ST changes. HR 86, prolonged Qt/Qtc 456 /554 msec Qt/Qtc manually assessed 440 msec/Qtc 47  11/22/24 1751:                 11/22/24 1446:                  Echo:  No results found for this or any previous visit.     Ejection Fractions:  No results found for: \"EF\"  Cath:  No results found for this or any previous visit.     Stress Test:  No results found for this or any previous visit.     Cardiac Imaging:  No results found for this or any previous visit.     CT Angio Chest 11/22/24:     STUDY:  CT Angiogram of the Chest; 11/22/2024 6:40 PM.  INDICATION:  Chest pain.  Elevated d-dimer.  Tachycardia.  Evaluate for pulmonary  embolism.  COMPARISON:  CXR 11/22/2024.  ACCESSION NUMBER(S):  [id number]  ORDERING CLINICIAN:  ASHLEE MARINO  TECHNIQUE:  CTA of the chest was performed with intravenous contrast.   Images are reviewed and processed at a workstation according to the CT  angiogram protocol with 3-D and/or MIP post processing imaging  generated.  Omnipaque 350 75 mL was administered intravenously.  Automated mA/kV exposure control was utilized and patient examination  was performed in strict accordance with principles of ALARA.  FINDINGS:  Pulmonary arteries are adequately opacified without acute or chronic  filling defects.  The thoracic aorta is normal in course and caliber  without dissection or aneurysm.  The heart is normal in size without pericardial effusion.  Thoracic  lymph nodes are not enlarged.  There is a right moderate right and mild left pleural effusion.  No  pleural thickening, or pneumothorax.  The airways are patent.  Lungs are clear without consolidation, interstitial disease, or  suspicious nodules.  Upper abdomen demonstrates no acute pathology.  There is mild  perihepatic ascites.  There are no acute fractures.  No " suspicious bony lesions.  There are  prominent anterior bridging osteophytes at T8-9 and T9-10.  No  suspicious bone lesion.  IMPRESSION:  1. No acute or chronic pulmonary emboli.  2. No thoracic aortic aneurysm or dissection.  3.  Moderate right and mild left pleural effusion.  4. Mild perihepatic ascites.        Inpatient Medications:  Scheduled medications   Medication Dose Route Frequency    amLODIPine  10 mg oral Daily    aspirin  81 mg oral Daily    empagliflozin  10 mg oral Daily    enoxaparin  40 mg subcutaneous q24h    furosemide  20 mg intravenous BID    losartan  50 mg oral Daily    [Held by provider] metoprolol tartrate  25 mg oral BID    perflutren lipid microspheres  0.5-10 mL of dilution intravenous Once in imaging    perflutren protein A microsphere  0.5 mL intravenous Once in imaging    spironolactone  25 mg oral Daily    sulfur hexafluoride microsphr  2 mL intravenous Once in imaging     PRN medications   Medication    hydrALAZINE    nitroglycerin    ondansetron ODT    Or    ondansetron     Continuous Medications   Medication Dose Last Rate    oxygen   Stopped (11/23/24 0425)       Physical Exam:  General:  Patient is awake, alert, and oriented.  Patient is in no acute distress.  HEENT:  Pupils equal and reactive.  Normocephalic.  Moist mucosa.    Neck: +JVD ~10 cm.   Cardiovascular:  Regular rate and rhythm.  Normal S1 and S2. No m/r/g.   Pulmonary:  Clear to auscultation bilaterally.  Abdomen:  Soft. Non-tender.   Non-distended.  Positive bowel sounds.  Lower Extremities:  2+ pedal pulses. No LE edema.  Neurologic:  Cranial nerves intact.  No focal deficit.   Skin: Skin warm and dry, normal skin turgor.   Psychiatric: Normal affect.     Assessment/Plan   Polo Orellana is a 49 M with no significant PMHx per patient presenting with sob, cp. He presented to Urgent Care yesterday with c/o cp, sob. Found to be hypoxic 88% on RA and tachycardic at 121. EMS called and transported patient to the ED.  "  Cardiology consulted for \"new CHF, NSTEMI\".      #HTN emergency. /157 on admit with evidence of renal dysfunction on BMP Scr. 1.39, MOSES vs CKD.   Not on any home anti-hypotensives prior to admit. Started on Losartan, Spironlactone.   11/24/24: BP remains uncontrolled, starting Amlodipine. IV Hydralazine prn   11/25/24: BP remains uncontrolled, switching Losartan to Entresto, starting Carvedilol    #Elevated Troponin. HsTrop 610/675/764/704. No clear ACS. ECG SR with possible LAE, incomplete RBBB, nonspecific ST changes. HR 86, prolonged Qt/Qtc 456 /554 msec Qt/Qtc manually assessed 440 msec/Qtc 479.  Non-MI troponin elevation / acute non-traumatic myocardial injury in the setting of htn emergency, acute heart failure. Core Measures do not apply. Initially started on Heparin gtt which has since been discontinued.     #Chest Pain. \"Chest tightness\" with sob likely 2/2 to acute heart failure. Will need ischemic workup at some point pending clinical course, likely outpatient.     #Acute Combined Systolic and Diastolic Heart Failure.  Likely hypertensive 2/2 long history of untreated HTN vs ischemic. No prior hx of CHF. Moderately volume overloaded on physical exam. BnP elevated to 1436.   CTA chest negative for PE. Moderate right and mild left pleural effusion, mild perihepatic ascites. Chest x-ray with mild pulmonary vascular congestion. IV diuresis. Started on Losartan, Jardiance. Hold beta blockers for now pending TTE and euvolemia.   11/24/24: Diuresing.  Negative 4.2 L since admit. Remains mildly volume up on exam, continue IV diuresis with Furosemide 20 mg IVP  11/25/24: Diuresing. Still volume up but much improved.     TTE 11/25/24: LVEF 40-45%, global HK, pseudonormal diastolic filling, mildly dilated LV, LVIDd 5.91 cm, IVSd 1.3 cm, severe concentric LVH, moderately enlarged RV size, normal RV fx, LA sev dilated, RA mod dilated, mild pHTN, RVSP 40.3 mmHg    #pHTN, likely group 2.  #Severe " LVH    Plan/Recs:   -Start Carvedilol 12.5 mg BID.   -Stop IV Furosemide and transition to Furosemide 40 mg daily for discharge.  -Stop Losartan and start Entresto 49-51 mg BID. Pharmacy has met with patient: he reported that he should have insurance through his job but some sort of error during enrollment occurred and his boss/HR is working on it. Not a candidate for patient assistance program. He would qualify for 14 day supply of Jardiance and likely 30 day supply of Entresto. If after 30 days, insurance not sorted out and unable to afford, would have to switch back to Losartan.   -Will plan to repeat TTE in around 3 months as outpatient. If LVEF remains depressed, will plan for coronary angiography as part of ischemic evaluation.     Code Status:  Full Code            Adelaida Cadena, CHESTER-CNP

## 2024-11-25 NOTE — PROGRESS NOTES
11/25/24 1534   Discharge Planning   Living Arrangements Alone   Support Systems Family members;Friends/neighbors   Assistance Needed Independent, drives, works   Type of Residence Private residence   Number of Stairs to Enter Residence 3   Number of Stairs Within Residence 0   Do you have animals or pets at home? No   Who is requesting discharge planning? Provider   Home or Post Acute Services None   Expected Discharge Disposition Home   Does the patient need discharge transport arranged? Yes   RoundTrip coordination needed? No   Has discharge transport been arranged? No   Financial Resource Strain   How hard is it for you to pay for the very basics like food, housing, medical care, and heating? Not hard   Housing Stability   In the last 12 months, was there a time when you were not able to pay the mortgage or rent on time? N   In the past 12 months, how many times have you moved where you were living? 0   At any time in the past 12 months, were you homeless or living in a shelter (including now)? N   Transportation Needs   In the past 12 months, has lack of transportation kept you from medical appointments or from getting medications? no   Patient Choice   Provider Choice list and CMS website (https://medicare.gov/care-compare#search) for post-acute Quality and Resource Measure Data were provided and reviewed with: Patient   Patient / Family choosing to utilize agency / facility established prior to hospitalization No   Intensity of Service   Intensity of Service 0-30 min          Met with patient at bedside and explained my role as care coordinator.He lives alone in the house. He is independent with his care at home. He drives and works. Patient denies any use of ambulatory devices. No oxygen in use at home, no HD. Patient stated he doesn't have PCP, or health insurance and is in process of getting one and will get PCP.  List printed from care port according to geographic location and given to patient. Patient  is diuresing. ECHO is pending. Patient denies any needs going home.

## 2024-11-25 NOTE — ED NOTES
Community Resource Name: Patient applied for insurance through HRS, but was over the income for [place] Medicaid.  Phone Number:   Staff Member: Tawny Guzmán     Discussed the following topics on behalf of the patient:  []  Behavioral Health Assistance     []  Case Management  []   Assistance  []  Digital Equity Assistance  []  Dental Health Assistance  []  Education Assistance  []  Employment Assistance  []  Financial Strain Relief Assistance  []  Food Insecurity Assistance  [x]  Healthcare Coverage Assistance  []  Housing Stability Assistance  []  IP Violence Relief Assistance  []  Legal Assistance  []  Physical Activity Assistance  []  Social Connection Assistance  []  Stress Relief Assistance   []  Substance Abuse Assistance  []  Transportation Assistance  []  Utility Assistance  [x]  Other: [insert comment here]  Patient doesn't have a PCP or insurance and over the income .  Next Steps:         ROOSEVELT Ruth  'Track patients for community healthcare services. CHW was informed by [facility] that patient needs to sign paperwork regarding  being over the income for [place] Medicaid insurance. CHW  explained the HRS paperwork to patient, patient signed off, and CHW sent paperwork back to [facility]. Patient expressed appreciation.  Responsible Staff  ROOSEVELT Ruth CCHW  11/25/24 4602

## 2024-11-25 NOTE — PROGRESS NOTES
Polo Orellana was identified as being a new start on a high cost medication.    Medication name(s): Jardiance 10 mg daily    Discussed with patient. Per patient, he should have insurance through his job but some sort of error during enrollment occurred and his boss/HR is working on it. Patient's income is too high for patient assistance programs at this time. Discussed Jardiance 14-day free trial to allow for some time on the insurance (patient reports he could pay cash rate once for 30 days if he needed to). Discussed current medications as ordered inpatient and how they help with heart failure.     Discussed how echo read is pending and how based on results, the cardiologists may change the losartan to Entresto. Entresto is also an expensive medication, but I did not share too many details on cost at this time. I will keep an eye out for if this change occurs.    Also discussed healthy at home program to help with getting patient to establishing new PCP if attending chooses to place.     Medication counseling provided to patient on what the medication does, dosing, side effects, and monitoring parameters. Answered all questions regarding medication, cost, and assistance options to the best of my ability.    Steffany Bonilla, PharmD  Transitions of Care  P: [phone]

## 2024-11-26 VITALS
WEIGHT: 260.6 LBS | DIASTOLIC BLOOD PRESSURE: 104 MMHG | HEIGHT: 65 IN | HEART RATE: 86 BPM | SYSTOLIC BLOOD PRESSURE: 150 MMHG | RESPIRATION RATE: 20 BRPM | BODY MASS INDEX: 43.42 KG/M2 | TEMPERATURE: 97.3 F | OXYGEN SATURATION: 98 %

## 2024-11-26 DIAGNOSIS — I50.23 ACUTE ON CHRONIC SYSTOLIC HEART FAILURE: Primary | ICD-10-CM

## 2024-11-26 LAB
ANION GAP SERPL CALC-SCNC: 13 MMOL/L (ref 10–20)
BUN SERPL-MCNC: 19 MG/DL (ref 6–23)
CALCIUM SERPL-MCNC: 9.1 MG/DL (ref 8.6–10.3)
CHLORIDE SERPL-SCNC: 103 MMOL/L (ref 98–107)
CO2 SERPL-SCNC: 26 MMOL/L (ref 21–32)
CREAT SERPL-MCNC: 1.35 MG/DL (ref 0.5–1.3)
EGFRCR SERPLBLD CKD-EPI 2021: 64 ML/MIN/1.73M*2
ERYTHROCYTE [DISTWIDTH] IN BLOOD BY AUTOMATED COUNT: 13.7 % (ref 11.5–14.5)
GLUCOSE SERPL-MCNC: 133 MG/DL (ref 74–99)
HCT VFR BLD AUTO: 50.2 % (ref 41–52)
HGB BLD-MCNC: 16.2 G/DL (ref 13.5–17.5)
MCH RBC QN AUTO: 27.8 PG (ref 26–34)
MCHC RBC AUTO-ENTMCNC: 32.3 G/DL (ref 32–36)
MCV RBC AUTO: 86 FL (ref 80–100)
NRBC BLD-RTO: 0 /100 WBCS (ref 0–0)
PLATELET # BLD AUTO: 243 X10*3/UL (ref 150–450)
POTASSIUM SERPL-SCNC: 4.7 MMOL/L (ref 3.5–5.3)
RBC # BLD AUTO: 5.82 X10*6/UL (ref 4.5–5.9)
SODIUM SERPL-SCNC: 137 MMOL/L (ref 136–145)
WBC # BLD AUTO: 6.7 X10*3/UL (ref 4.4–11.3)

## 2024-11-26 PROCEDURE — 82746 ASSAY OF FOLIC ACID SERUM: CPT | Mod: AHULAB | Performed by: STUDENT IN AN ORGANIZED HEALTH CARE EDUCATION/TRAINING PROGRAM

## 2024-11-26 PROCEDURE — 2500000001 HC RX 250 WO HCPCS SELF ADMINISTERED DRUGS (ALT 637 FOR MEDICARE OP): Performed by: INTERNAL MEDICINE

## 2024-11-26 PROCEDURE — 85027 COMPLETE CBC AUTOMATED: CPT | Performed by: INTERNAL MEDICINE

## 2024-11-26 PROCEDURE — 82306 VITAMIN D 25 HYDROXY: CPT | Mod: AHULAB | Performed by: STUDENT IN AN ORGANIZED HEALTH CARE EDUCATION/TRAINING PROGRAM

## 2024-11-26 PROCEDURE — 99239 HOSP IP/OBS DSCHRG MGMT >30: CPT | Performed by: STUDENT IN AN ORGANIZED HEALTH CARE EDUCATION/TRAINING PROGRAM

## 2024-11-26 PROCEDURE — 36415 COLL VENOUS BLD VENIPUNCTURE: CPT | Performed by: INTERNAL MEDICINE

## 2024-11-26 PROCEDURE — 2500000004 HC RX 250 GENERAL PHARMACY W/ HCPCS (ALT 636 FOR OP/ED): Performed by: INTERNAL MEDICINE

## 2024-11-26 PROCEDURE — 82607 VITAMIN B-12: CPT | Mod: AHULAB | Performed by: STUDENT IN AN ORGANIZED HEALTH CARE EDUCATION/TRAINING PROGRAM

## 2024-11-26 PROCEDURE — 2500000002 HC RX 250 W HCPCS SELF ADMINISTERED DRUGS (ALT 637 FOR MEDICARE OP, ALT 636 FOR OP/ED): Performed by: INTERNAL MEDICINE

## 2024-11-26 PROCEDURE — 99232 SBSQ HOSP IP/OBS MODERATE 35: CPT | Performed by: NURSE PRACTITIONER

## 2024-11-26 PROCEDURE — 2500000001 HC RX 250 WO HCPCS SELF ADMINISTERED DRUGS (ALT 637 FOR MEDICARE OP): Performed by: NURSE PRACTITIONER

## 2024-11-26 PROCEDURE — 82374 ASSAY BLOOD CARBON DIOXIDE: CPT | Performed by: INTERNAL MEDICINE

## 2024-11-26 RX ORDER — NITROGLYCERIN 0.4 MG/1
0.4 TABLET SUBLINGUAL EVERY 5 MIN PRN
Qty: 90 TABLET | Refills: 12 | Status: SHIPPED | OUTPATIENT
Start: 2024-11-26

## 2024-11-26 RX ORDER — SPIRONOLACTONE 25 MG/1
25 TABLET ORAL DAILY
Qty: 60 TABLET | Refills: 0 | Status: SHIPPED | OUTPATIENT
Start: 2024-11-27

## 2024-11-26 RX ORDER — FUROSEMIDE 40 MG/1
40 TABLET ORAL DAILY
Qty: 60 TABLET | Refills: 0 | Status: SHIPPED | OUTPATIENT
Start: 2024-11-27

## 2024-11-26 RX ORDER — CARVEDILOL 12.5 MG/1
12.5 TABLET ORAL 2 TIMES DAILY
Qty: 60 TABLET | Refills: 0 | Status: SHIPPED | OUTPATIENT
Start: 2024-11-26

## 2024-11-26 RX ORDER — ASPIRIN 81 MG/1
81 TABLET ORAL DAILY
Qty: 60 TABLET | Refills: 0 | Status: SHIPPED | OUTPATIENT
Start: 2024-11-27

## 2024-11-26 RX ORDER — AMLODIPINE BESYLATE 10 MG/1
10 TABLET ORAL DAILY
Qty: 60 TABLET | Refills: 0 | Status: SHIPPED | OUTPATIENT
Start: 2024-11-27

## 2024-11-26 ASSESSMENT — COGNITIVE AND FUNCTIONAL STATUS - GENERAL
MOBILITY SCORE: 24
DAILY ACTIVITIY SCORE: 24

## 2024-11-26 ASSESSMENT — PAIN - FUNCTIONAL ASSESSMENT
PAIN_FUNCTIONAL_ASSESSMENT: 0-10

## 2024-11-26 ASSESSMENT — PAIN SCALES - GENERAL
PAINLEVEL_OUTOF10: 0 - NO PAIN

## 2024-11-26 NOTE — DISCHARGE INSTRUCTIONS
Mr. Orellana it was a pleasure taking care of you.   You were admitted for shortness of breath and chest pain in the setting of extremely high blood pressure and acute heart failure. You will be on a new medication regimen that is listed below. Please follow up with cardiology. You have a appointment scheduled for 12/23/2024 with Dr. Young.   Spike,  Your Internal Medicine Team

## 2024-11-26 NOTE — NURSING NOTE
Discharge instructions provided using teach back method. Pt's health related  risk factors discussed with pt. pt educated to look for any worsening sign and symptoms. Pt educated to seek medical attention if experience any medical emergency. Pt aware to follow up with outpatient clinics as scheduled. Home going meds reviewed with pt. Pt verbalized understanding of disposition and discharge instructions. All questions answered to patient's satisfaction and within nursing scope of practice. Vitals stable, IV removed.     Pt's wife has covid and he has some URI symptoms would like a test  Will order

## 2024-11-26 NOTE — PROGRESS NOTES
11/26/24 1205   Discharge Planning   Expected Discharge Disposition Home        Patient's IV Lasix was changed to oral. ECHO was completed and his EF is 45%. Patient is possible discharge home today, no needs identified.    1445     Patient had active discharge orders. Flyer given to patient for [facility]. He was referred to [facility] Home.    1505    Patient is medically ready for discharge  They are being discharged to:  Home  _Family_________ will pick patient up  SNF-     N/A                        Facility             Nurse/ family notified             Report number             Transport time  [facility]   N/A             Name of agency on AVS               DME?    N/A             Company             [facility] provided   O2?    N/A               Home 02 eval done               Script given to Pulm  Wounds    N/A               Where will patient follow up for wound care?               Who will be doing the dressing               Nurse instructed to provide dressing supplies for 2 days               Patient has script for more dressing supplies    Patient denies any other needs

## 2024-11-26 NOTE — PROGRESS NOTES
Subjective/Objective Data:  Down 35lbs  Hoping to be discharged home      Feels better. No further sob, BACH. No cp. BLE swelling much improved.     TTE 24: LVEF 40-45%, global HK, pseudonormal diastolic filling, mildly dilated LV, LVIDd 5.91 cm, IVSd 1.3 cm, severe concentric LVH, moderately enlarged RV size, normal RV fx, LA sev dilated, RA mod dilated, mild pHTN, RVSP 40.3 mmHg     BP remains uncontrolled.     Subjectively diuresing. I&O inaccurate.     Last Recorded Vitals:  Vitals:    24 0400 24 0600 24 0727 24 1154   BP: 136/86  (!) 151/96 (!) 142/100   BP Location: Right arm  Right arm Left arm   Patient Position: Lying  Lying Lying   Pulse:    85   Resp: 18   18   Temp: 36.4 °C (97.6 °F)  36.1 °C (97 °F) 36.2 °C (97.1 °F)   TempSrc: Temporal  Temporal Temporal   SpO2: 96%  96% 97%   Weight:  118 kg (260 lb 9.6 oz)     Height:         Medical Gas Therapy: None (Room air)  Weight  Av kg (276 lb 11.9 oz)  Min: 118 kg (260 lb 9.6 oz)  Max: 135 kg (297 lb 9.9 oz)    LABS:  CMP:  Results from last 7 days   Lab Units 24  0531 24  0554 24  0553 24  0512 24  1519   SODIUM mmol/L 137 141 142 140 138   POTASSIUM mmol/L 4.7 3.8 3.3* 3.7 3.7   CHLORIDE mmol/L 103 108* 105 104 102   CO2 mmol/L 26 23 27 27 22   ANION GAP mmol/L 13 14 13 13 18   BUN mg/dL 19 16 16 17 17   CREATININE mg/dL 1.35* 1.25 1.40* 1.29 1.39*   EGFR mL/min/1.73m*2 64 70 61 68 62   MAGNESIUM mg/dL  --   --   --  1.97 1.96   ALBUMIN g/dL  --   --   --   --  4.5   ALT U/L  --   --   --   --  28   AST U/L  --   --   --   --  25   BILIRUBIN TOTAL mg/dL  --   --   --   --  1.9*     CBC:  Results from last 7 days   Lab Units 24  0531 24  0554 24  0512 24  1519   WBC AUTO x10*3/uL 6.7 5.5 5.8 9.1   HEMOGLOBIN g/dL 16.2 14.8 14.7 15.6   HEMATOCRIT % 50.2 45.2 44.6 47.4   PLATELETS AUTO x10*3/uL 243 202 200 205   MCV fL 86 87 85 85     COAG:     ABO: No results found  "for: \"ABO\"  HEME/ENDO:  Results from last 7 days   Lab Units 11/23/24  0512 11/22/24  1615 11/22/24  1519   TSH mIU/L 5.68*  --  3.02   HEMOGLOBIN A1C %  --  7.1*  --       CARDIAC:   Results from last 7 days   Lab Units 11/23/24  0512 11/22/24  2344 11/22/24  1615 11/22/24  1519   TROPHS ng/L 704* 764* 675* 610*   BNP pg/mL  --   --  1,436*  --       Results from last 7 days   Lab Units 11/23/24  0512 11/22/24  1615   HEMOGLOBIN A1C %  --  7.1*   LDL CALC mg/dL 90  --    VLDL mg/dL 14  --    CHOLESTEROL/HDL RATIO  4.6  --         Last I/O:    Intake/Output Summary (Last 24 hours) at 11/26/2024 1342  Last data filed at 11/26/2024 0903  Gross per 24 hour   Intake 240 ml   Output --   Net 240 ml     Net IO Since Admission: -3,720 mL [11/26/24 1342]      Imaging Results:  US renal complete    Result Date: 11/23/2024  Interpreted By:  Dmitri Arreguin, STUDY: US RENAL COMPLETE;  11/23/2024 6:03 am   INDICATION: Signs/Symptoms:denny.     COMPARISON: None.   ACCESSION NUMBER(S): [id number]   ORDERING CLINICIAN: ARIANNA DUQUE   TECHNIQUE: Multiple images of the kidneys were obtained  .   FINDINGS: RIGHT KIDNEY: The right kidney measures 10.8 cm in length. The renal cortical echogenicity and thickness are within normal limits. No hydronephrosis is present; no evidence of nephrolithiasis. Simple right renal cyst measuring approximately 9 mm in greatest diameter.   LEFT KIDNEY: The left kidney measures 11.4 cm in length. The renal cortical echogenicity and thickness are within normal limits. No hydronephrosis is present; no evidence of nephrolithiasis.   BLADDER: Grossly unremarkable.   Incidental note is made of splenomegaly.       No hydronephrosis.   Incidental note is made of splenomegaly.   MACRO: None   Signed by: Dmitri Arreguin 11/23/2024 11:46 AM Dictation workstation:   [id number]    CT angio chest for pulmonary embolism    Result Date: 11/22/2024  STUDY: CT Angiogram of the Chest; 11/22/2024 6:40 PM. INDICATION: Chest " pain.  Elevated d-dimer.  Tachycardia.  Evaluate for pulmonary embolism. COMPARISON: CXR 11/22/2024. ACCESSION NUMBER(S): [id number] ORDERING CLINICIAN: ASHLEE MARINO TECHNIQUE:  CTA of the chest was performed with intravenous contrast. Images are reviewed and processed at a workstation according to the CT angiogram protocol with 3-D and/or MIP post processing imaging generated.  Omnipaque 350 75 mL was administered intravenously. Automated mA/kV exposure control was utilized and patient examination was performed in strict accordance with principles of ALARA. FINDINGS: Pulmonary arteries are adequately opacified without acute or chronic filling defects.  The thoracic aorta is normal in course and caliber without dissection or aneurysm. The heart is normal in size without pericardial effusion.  Thoracic lymph nodes are not enlarged. There is a right moderate right and mild left pleural effusion.  No pleural thickening, or pneumothorax.  The airways are patent. Lungs are clear without consolidation, interstitial disease, or suspicious nodules. Upper abdomen demonstrates no acute pathology.  There is mild perihepatic ascites. There are no acute fractures.  No suspicious bony lesions.  There are prominent anterior bridging osteophytes at T8-9 and T9-10.  No suspicious bone lesion.    1. No acute or chronic pulmonary emboli. 2. No thoracic aortic aneurysm or dissection. 3.  Moderate right and mild left pleural effusion. 4. Mild perihepatic ascites. Signed by Bernard De La Vega MD    XR chest 2 views    Result Date: 11/22/2024  STUDY: Chest Radiographs;  11/22/2024 3:41PM INDICATION: Chest pain, shortness of breath. Concern for congestive heart failure. COMPARISON: None available. ACCESSION NUMBER(S): [id number] ORDERING CLINICIAN: ASHLEE MARINO TECHNIQUE:  Frontal and lateral chest. FINDINGS: CARDIOMEDIASTINAL SILHOUETTE: There is cardiomegaly  LUNGS: There is mild pulmonary vascular congestion..  ABDOMEN: No  "remarkable upper abdominal findings.  BONES: No acute osseous changes.    Mild pulmonary vascular congestion. Signed by William Jackson M.D.    ECG 12 Lead    Result Date: 11/22/2024  Arrhythmia. Tachy           Past Cardiology Tests (Last 3 Years):  EKG:ECG SR with possible LAE, incomplete RBBB, nonspecific ST changes. HR 86, prolonged Qt/Qtc 456 /554 msec Qt/Qtc manually assessed 440 msec/Qtc 47  11/22/24 1751:                 11/22/24 1446:                  Echo:  No results found for this or any previous visit.     Ejection Fractions:  No results found for: \"EF\"  Cath:  No results found for this or any previous visit.     Stress Test:  No results found for this or any previous visit.     Cardiac Imaging:  No results found for this or any previous visit.     CT Angio Chest 11/22/24:     STUDY:  CT Angiogram of the Chest; 11/22/2024 6:40 PM.  INDICATION:  Chest pain.  Elevated d-dimer.  Tachycardia.  Evaluate for pulmonary  embolism.  COMPARISON:  CXR 11/22/2024.  ACCESSION NUMBER(S):  [id number]  ORDERING CLINICIAN:  ASHLEE AMRINO  TECHNIQUE:  CTA of the chest was performed with intravenous contrast.   Images are reviewed and processed at a workstation according to the CT  angiogram protocol with 3-D and/or MIP post processing imaging  generated.  Omnipaque 350 75 mL was administered intravenously.  Automated mA/kV exposure control was utilized and patient examination  was performed in strict accordance with principles of ALARA.  FINDINGS:  Pulmonary arteries are adequately opacified without acute or chronic  filling defects.  The thoracic aorta is normal in course and caliber  without dissection or aneurysm.  The heart is normal in size without pericardial effusion.  Thoracic  lymph nodes are not enlarged.  There is a right moderate right and mild left pleural effusion.  No  pleural thickening, or pneumothorax.  The airways are patent.  Lungs are clear without consolidation, interstitial disease, " or  suspicious nodules.  Upper abdomen demonstrates no acute pathology.  There is mild  perihepatic ascites.  There are no acute fractures.  No suspicious bony lesions.  There are  prominent anterior bridging osteophytes at T8-9 and T9-10.  No  suspicious bone lesion.  IMPRESSION:  1. No acute or chronic pulmonary emboli.  2. No thoracic aortic aneurysm or dissection.  3.  Moderate right and mild left pleural effusion.  4. Mild perihepatic ascites.        Inpatient Medications:  Scheduled medications   Medication Dose Route Frequency    amLODIPine  10 mg oral Daily    aspirin  81 mg oral Daily    carvedilol  12.5 mg oral BID    empagliflozin  10 mg oral Daily    enoxaparin  40 mg subcutaneous q24h    furosemide  40 mg oral Daily    perflutren protein A microsphere  0.5 mL intravenous Once in imaging    sacubitriL-valsartan  1 tablet oral BID    spironolactone  25 mg oral Daily    sulfur hexafluoride microsphr  2 mL intravenous Once in imaging     PRN medications   Medication    hydrALAZINE    nitroglycerin    ondansetron ODT    Or    ondansetron     Continuous Medications   Medication Dose Last Rate    oxygen   Stopped (11/23/24 0425)       Physical Exam:  General:  Patient is awake, alert, and oriented.  Patient is in no acute distress.  HEENT:  Pupils equal and reactive.  Normocephalic.  Moist mucosa.    Neck: +JVD ~10 cm.   Cardiovascular:  Regular rate and rhythm.  Normal S1 and S2. No m/r/g.   Pulmonary:  Clear to auscultation bilaterally.  Abdomen:  Soft. Non-tender.   Non-distended.  Positive bowel sounds.  Lower Extremities:  2+ pedal pulses. 1+ LE edema  Neurologic:  Cranial nerves intact.  No focal deficit.   Skin: Skin warm and dry, normal skin turgor.   Psychiatric: Normal affect.     Assessment/Plan   Polo Orellana is a 49 M with no significant PMHx per patient presenting with sob, cp. He presented to Urgent Care yesterday with c/o cp, sob. Found to be hypoxic 88% on RA and tachycardic at 121. EMS  "called and transported patient to the ED.   Cardiology consulted for \"new CHF, NSTEMI\".      #HTN emergency. /157 on admit with evidence of renal dysfunction on BMP Scr. 1.39, MOSES vs CKD.   Not on any home anti-hypotensives prior to admit. Started on Losartan, Spironlactone.   11/24/24: BP remains uncontrolled, starting Amlodipine. IV Hydralazine prn   11/25/24: BP remains uncontrolled, switching Losartan to Entresto, starting Carvedilol    #Elevated Troponin. HsTrop 610/675/764/704. No clear ACS. ECG SR with possible LAE, incomplete RBBB, nonspecific ST changes. HR 86, prolonged Qt/Qtc 456 /554 msec Qt/Qtc manually assessed 440 msec/Qtc 479.  Non-MI troponin elevation / acute non-traumatic myocardial injury in the setting of htn emergency, acute heart failure. Core Measures do not apply. Initially started on Heparin gtt which has since been discontinued.     #Chest Pain. \"Chest tightness\" with sob likely 2/2 to acute heart failure. Will need ischemic workup at some point pending clinical course, likely outpatient.     #Acute Combined Systolic and Diastolic Heart Failure.  Likely hypertensive 2/2 long history of untreated HTN vs ischemic. No prior hx of CHF. Moderately volume overloaded on physical exam. BnP elevated to 1436.   CTA chest negative for PE. Moderate right and mild left pleural effusion, mild perihepatic ascites. Chest x-ray with mild pulmonary vascular congestion. IV diuresis. Started on Losartan, Jardiance. Hold beta blockers for now pending TTE and euvolemia.   11/24/24: Diuresing.  Negative 4.2 L since admit. Remains mildly volume up on exam, continue IV diuresis with Furosemide 20 mg IVP  11/25/24: Diuresing. Still volume up but much improved.     TTE 11/25/24: LVEF 40-45%, global HK, pseudonormal diastolic filling, mildly dilated LV, LVIDd 5.91 cm, IVSd 1.3 cm, severe concentric LVH, moderately enlarged RV size, normal RV fx, LA sev dilated, RA mod dilated, mild pHTN, RVSP 40.3 " mmHg    #pHTN, likely group 2.  #Severe LVH    Plan/Recs:   -Discharge GDMT:  *BB: Carvedilol 12.5 mg BID.   *ACE/ARB/ARNI:  Entresto 49-51 mg BID.   *MRA:   Jardiance 10mg daily  *SGLT2-inhibitor:   Spironolactone 25mg daily    - home with furosemide 40mg oral daily.     Pharmacy has met with patient: he reported that he should have insurance through his job but some sort of error during enrollment occurred and his boss/HR is working on it. Not a candidate for patient assistance program. He would qualify for 14 day supply of Jardiance and likely 30 day supply of Entresto. If after 30 days, insurance not sorted out and unable to afford, would have to switch back to Losartan.     -Will plan to repeat TTE in around 3 months as outpatient. If LVEF remains depressed, will plan for coronary angiography as part of ischemic evaluation.     Cardiology follow up is scheduled for 12/23/2024 with Dr. LEONEL Young.    OK to discharge home at the discretion of the primary team.     Code Status:  Full Code            Jennie Connell, APRN-CNP

## 2024-11-26 NOTE — PROGRESS NOTES
Cardiology NP Adelaida Cadena notified me last night of plan to use Entresto due to echo read of LVEF of 40-45% and persistent hypertension. Discussed this morning that plan with the patient. Free 30-day voucher will be used via meds to beds while patient works out insurance.     Discussed briefly that controlling his blood pressure is one of the best things he can do to prevent worsening. Patient does not have a BP cuff at home but thinks his parents have one. He states he will  his own OTC.    Provided patient with websites to sign up for Jardiance and Entresto copay cards as well as my phone number for any medication issues post-discharge.    Asked meds to beds pharmacist today to offer BP cuff with medication delivery once discharge orders and meds are received.    Steffany Bonilla, PharmD

## 2024-11-26 NOTE — CARE PLAN
The patient's goals for the shift include is to rest  The clinical goals for the shift include to remain hemodynamically stable    Problem: Pain - Adult  Goal: Verbalizes/displays adequate comfort level or baseline comfort level  Outcome: Progressing     Problem: Safety - Adult  Goal: Free from fall injury  Outcome: Progressing     Problem: Discharge Planning  Goal: Discharge to home or other facility with appropriate resources  Outcome: Progressing     Problem: Chronic Conditions and Co-morbidities  Goal: Patient's chronic conditions and co-morbidity symptoms are monitored and maintained or improved  Outcome: Progressing     Problem: Heart Failure  Goal: Improved gas exchange this shift  Outcome: Progressing  Goal: Improved urinary output this shift  Outcome: Progressing  Goal: Reduction in peripheral edema within 24 hours  Outcome: Progressing  Goal: Report improvement of dyspnea/breathlessness this shift  Outcome: Progressing  Goal: Weight from fluid excess reduced over 2-3 days, then stabilize  Outcome: Progressing  Goal: Increase self care and/or family involvement in 24 hours  Outcome: Progressing

## 2024-11-26 NOTE — NURSING NOTE
Met with patient at bedside.  States he was extremely SOB with activity.  Unable to work bc SOB, swelling and fluid weight gain.  NYHA Class III.  Feeling better after being diuresed. Discussed [employer] HFMS to monitor fluid status post discharge for a month.    HEART FAILURE EDUCATION:  1. Weigh yourself daily and record on your weight log.  2. If you gain more than 2 or 3 pounds overnight, call your cardiologist.  3. Follow a low sodium diet. No more than 2000 mg in one day, or more than 650 mg per meal.  4. Limit total fluids to no more than 8 cups (or 2 liters) per day - this includes all fluids (water, coffee, juice, milk, tea, etc.)  5. Monitor your blood pressure daily and record on your weight log.  6. Call to schedule your follow-up appointments when you get home if they were not already scheduled for you.  7. Keep your follow-up appointments! Bring your weight log with you so the doctors can see your weight trend and blood pressure readings.  8. Be sure to  any new prescriptions and take them as directed. If unsure of the medications, be sure to call your cardiologist.  9. Stay as active as you can tolerate.   10. If you notice subtle change of symptoms (slight increase in swelling, slight shortness of breath, a new intolerance to laying flat, a new cough), be sure to call your cardiologist.   You have been referred to the [facility].This program is completely free and does not take the place of regularly scheduled doctor visits If you don't get a call from a nurse w/in 24 hours after discharge,please call [phone].

## 2024-11-26 NOTE — DISCHARGE SUMMARY
Discharge Diagnosis  Acute congestive heart failure, unspecified heart failure type    Issues Requiring Follow-Up  Cardiology follow up     Discharge Meds     Medication List      START taking these medications     amLODIPine 10 mg tablet; Commonly known as: Norvasc; Take 1 tablet (10   mg) by mouth once daily.; Start taking on: November 27, 2024   aspirin 81 mg EC tablet; Take 1 tablet (81 mg) by mouth once daily.;   Start taking on: November 27, 2024   carvedilol 12.5 mg tablet; Commonly known as: Coreg; Take 1 tablet (12.5   mg) by mouth 2 times a day.   empagliflozin 10 mg; Commonly known as: Jardiance; Take 1 tablet (10 mg)   by mouth once daily.; Start taking on: November 27, 2024   furosemide 40 mg tablet; Commonly known as: Lasix; Take 1 tablet (40 mg)   by mouth once daily.; Start taking on: November 27, 2024   nitroglycerin 0.4 mg SL tablet; Commonly known as: Nitrostat; Place 1   tablet (0.4 mg) under the tongue every 5 minutes if needed for chest pain.   sacubitriL-valsartan 49-51 mg tablet; Commonly known as: Entresto; Take   1 tablet by mouth 2 times a day.   spironolactone 25 mg tablet; Commonly known as: Aldactone; Take 1 tablet   (25 mg) by mouth once daily.; Start taking on: November 27, 2024     ASK your doctor about these medications     oxygen gas therapy; Commonly known as: O2; Inhale 1 each continuously.       Test Results Pending At Discharge  Pending Labs       No current pending labs.            Hospital Course   Polo Orellana is a 49 M with no significant PMHx per patient presenting with sob, cp. He presented to Urgent Care yesterday with c/o cp, sob. Found to be hypoxic 88% on RA and tachycardic at 121. EMS called and transported patient to the ED. Patient was found to have /157 on admit with evidence of renal dysfunction on BMP Scr. 1.39. Most likely representing undiagnosed CKD. Echo was performed which showed Left ventricular ejection fraction is mildly decreased, by visual  estimate at 40-45%  most likely secondary to uncontrolled HTN. CTA chest negative for PE. Moderate right and mild left pleural effusion, mild perihepatic ascites. Chest x-ray with mild pulmonary vascular congestion. IV diuresis was started. Patient was also started on amlodipine, Losartan, Jardiance. Cardiology was consulted and losartan was switched to entresto. Patient was out 4.2L during admission and appeared euvolemic on exam on day of discharge.     Discharging Medications   Amlodipine 5mg   Carvedilol 12.5 mg BID.   Entresto 49-51 mg BID.   Jardiance 10mg daily  Spironolactone 25mg daily  furosemide 40mg oral daily.     Pertinent Physical Exam At Time of Discharge    Physical Exam  Constitutional: No acute distress, awake, alert  Head/Neck: Neck supple  Respiratory/Thorax: Lungs are Clear to auscultation  Cardiovascular: Regular, rate and rhythm,  2+ equal pulses of the extremities, normal S 1and S 2  Gastrointestinal: Nondistended, soft, non-tender, no rebound tenderness or guarding  Extremities: BLE edema  Neurological: Awake and alert. No focal neurological deficits  Psychological: Appropriate mood and behavior    Outpatient Follow-Up  Future Appointments   Date Time Provider Department Center   12/23/2024  2:45 PM Star Young MD [id number]         Kasandra Bello MD

## 2024-11-30 ENCOUNTER — DOCUMENTATION (OUTPATIENT)
Dept: HOME HEALTH SERVICES | Age: 49
End: 2024-11-30

## 2024-11-30 ENCOUNTER — TELEMEDICINE (OUTPATIENT)
Dept: CARE COORDINATION | Age: 49
End: 2024-11-30

## 2024-11-30 ENCOUNTER — PATIENT OUTREACH (OUTPATIENT)
Dept: HOME HEALTH SERVICES | Age: 49
End: 2024-11-30

## 2024-11-30 VITALS — DIASTOLIC BLOOD PRESSURE: 93 MMHG | SYSTOLIC BLOOD PRESSURE: 139 MMHG

## 2024-11-30 DIAGNOSIS — E55.9 VITAMIN D DEFICIENCY: Primary | ICD-10-CM

## 2024-11-30 DIAGNOSIS — I50.9 ACUTE CONGESTIVE HEART FAILURE, UNSPECIFIED HEART FAILURE TYPE: Primary | ICD-10-CM

## 2024-11-30 LAB
25(OH)D3 SERPL-MCNC: 17 NG/ML (ref 30–100)
FOLATE SERPL-MCNC: 12.3 NG/ML
VIT B12 SERPL-MCNC: 436 PG/ML (ref 211–911)

## 2024-11-30 RX ORDER — ERGOCALCIFEROL 1.25 MG/1
50000 CAPSULE ORAL WEEKLY
Qty: 6 CAPSULE | Refills: 0 | Status: SHIPPED | OUTPATIENT
Start: 2024-11-30 | End: 2025-01-11

## 2024-11-30 NOTE — PROGRESS NOTES
RN called pt at this time to notify him of additional labs and script being ordered due to vitamin d deficiency found on labs per Dr. Jung. Pt educated on medication & OTC supplement to follow. Denies having any questions or concerns at this time. Aware to call for any that may arise.     Patient's Address:   [address] [place] [address] [address]  [facility] [address]  **  If this is not the address patient will receive services - alert team and address in EMR**       Patient Contacts:  Extended Emergency Contact Information  Primary Emergency Contact: Salbador Orellana  Home Phone: [phone]  Relation: Parent                                Patient's Preferred Phone: [phone]  Patient's E-mail: [email or web address]

## 2024-11-30 NOTE — PROGRESS NOTES
"[facility] pt and RN. Pt states things have been going well. Pt states he notices a little swelling \"here and there\" but not as bad as what he previously had. He has been trying to get more active. Denies chest pain or shortness of breath. Pt advised to begin monitoring and logging his heart rate. Verbalizes understanding. Pt states his scale will be arriving tomorrow. States he has lost about a half pound according to his parents scale. Pt states he has been working on learning what he can and can't have and adjusting his lifestyle. Medications reconciled with Dr. Jung. Dr. Jung to put in for repeat labs. A1C was addressed, pt in diabetic range. Next hhvc to be with Monty to get pt on glp1. Pt currently uninsured- states he wont find out more about insurance until he goes back to work on Monday. Pt denies any further questions/needs/concerns at this time. [facility] 12/5 @ 1330.    161/119 11/27  146/97 11/28  159/107 11/29  139/93-today    Patient's Address:   [address] [place] [address] # [facility] [address]  **  If this is not the address patient will receive services - alert team and address in EMR**       Patient Contacts:  Extended Emergency Contact Information  Primary Emergency Contact: Dale Orellanad  Home Phone: [phone]  Relation: Parent                                Patient's Preferred Phone: [phone]  Patient's E-mail: [email or web address]                                      "

## 2024-12-02 ENCOUNTER — PATIENT OUTREACH (OUTPATIENT)
Dept: CARE COORDINATION | Age: 49
End: 2024-12-02

## 2024-12-02 VITALS
SYSTOLIC BLOOD PRESSURE: 125 MMHG | WEIGHT: 257.4 LBS | DIASTOLIC BLOOD PRESSURE: 87 MMHG | HEART RATE: 80 BPM | BODY MASS INDEX: 42.83 KG/M2

## 2024-12-02 NOTE — PROGRESS NOTES
Daily Call Note:   Pt stated he was doing better today. No current complaints, vitals stable. Pt stated he is eating well and denies any swelling, chest pain SOB or lightheadedness. Next [facility] 12/5.    Topics for Daily Review: Pt demonstrates clear understanding: Yes    Daily VS:  /87   Pulse 80   Wt 117 kg (257 lb 6.4 oz)   BMI 42.83 kg/m²        Last 3 Weights:  Wt Readings from Last 7 Encounters:   12/02/24 117 kg (257 lb 6.4 oz)   11/26/24 118 kg (260 lb 9.6 oz)   11/20/20 (!) 145 kg (319 lb)       Masimo Device:  no    Virtual Visits--Scheduled (Most Recent Date at Top)  Follow up Appointments  Recent Visits  Date Type Provider Dept   11/30/24 Telemedicine Marizol Jung MD Healthy At Home   Showing recent visits within past 30 days and meeting all other requirements  Future Appointments  No visits were found meeting these conditions.  Showing future appointments within next 90 days and meeting all other requirements       Frequency of RN Calls & Virtual Visits per Team Agreement: Healthy at Home Frequency: Daily    Medication issues Addressed (what was done):     Follow up appointments scheduled by [facility] Staff:

## 2024-12-03 ENCOUNTER — LAB (OUTPATIENT)
Dept: LAB | Facility: LAB | Age: 49
End: 2024-12-03

## 2024-12-03 ENCOUNTER — PATIENT OUTREACH (OUTPATIENT)
Dept: CARE COORDINATION | Age: 49
End: 2024-12-03

## 2024-12-03 VITALS
SYSTOLIC BLOOD PRESSURE: 166 MMHG | WEIGHT: 254.6 LBS | BODY MASS INDEX: 42.37 KG/M2 | DIASTOLIC BLOOD PRESSURE: 106 MMHG

## 2024-12-03 DIAGNOSIS — I50.9 ACUTE CONGESTIVE HEART FAILURE, UNSPECIFIED HEART FAILURE TYPE: ICD-10-CM

## 2024-12-03 LAB
ALBUMIN SERPL BCP-MCNC: 4.3 G/DL (ref 3.4–5)
ALP SERPL-CCNC: 90 U/L (ref 33–120)
ALT SERPL W P-5'-P-CCNC: 45 U/L (ref 10–52)
ANION GAP SERPL CALC-SCNC: 10 MMOL/L (ref 10–20)
AST SERPL W P-5'-P-CCNC: 29 U/L (ref 9–39)
BILIRUB SERPL-MCNC: 0.8 MG/DL (ref 0–1.2)
BUN SERPL-MCNC: 25 MG/DL (ref 6–23)
CALCIUM SERPL-MCNC: 9.7 MG/DL (ref 8.6–10.3)
CHLORIDE SERPL-SCNC: 104 MMOL/L (ref 98–107)
CO2 SERPL-SCNC: 28 MMOL/L (ref 21–32)
CREAT SERPL-MCNC: 1.42 MG/DL (ref 0.5–1.3)
EGFRCR SERPLBLD CKD-EPI 2021: 60 ML/MIN/1.73M*2
GLUCOSE SERPL-MCNC: 154 MG/DL (ref 74–99)
POTASSIUM SERPL-SCNC: 4.9 MMOL/L (ref 3.5–5.3)
PROT SERPL-MCNC: 7.4 G/DL (ref 6.4–8.2)
SODIUM SERPL-SCNC: 137 MMOL/L (ref 136–145)

## 2024-12-03 PROCEDURE — 80053 COMPREHEN METABOLIC PANEL: CPT

## 2024-12-03 PROCEDURE — 36415 COLL VENOUS BLD VENIPUNCTURE: CPT

## 2024-12-03 NOTE — PROGRESS NOTES
Daily call completed. Pt states he is doing well. Denies any new symptoms or concerns. Pt went and got labs done. Denies need for med refills at this time. Denies any further questions/concerns/needs at this time. Aware to call in with any that develop. Aware of upcoming appts. [facility] 12/5 @ 9701.     166/106  254.6lb

## 2024-12-04 ENCOUNTER — PATIENT OUTREACH (OUTPATIENT)
Dept: CARE COORDINATION | Age: 49
End: 2024-12-04

## 2024-12-05 ENCOUNTER — TELEMEDICINE (OUTPATIENT)
Dept: PHARMACY | Facility: HOSPITAL | Age: 49
End: 2024-12-05

## 2024-12-05 ENCOUNTER — PATIENT OUTREACH (OUTPATIENT)
Dept: HOME HEALTH SERVICES | Age: 49
End: 2024-12-05

## 2024-12-05 ENCOUNTER — APPOINTMENT (OUTPATIENT)
Dept: CARE COORDINATION | Age: 49
End: 2024-12-05

## 2024-12-05 DIAGNOSIS — I50.9 ACUTE CONGESTIVE HEART FAILURE, UNSPECIFIED HEART FAILURE TYPE: ICD-10-CM

## 2024-12-05 DIAGNOSIS — I50.9 ACUTE CONGESTIVE HEART FAILURE, UNSPECIFIED HEART FAILURE TYPE: Primary | ICD-10-CM

## 2024-12-05 DIAGNOSIS — E11.9 TYPE 2 DIABETES MELLITUS WITHOUT COMPLICATION, WITHOUT LONG-TERM CURRENT USE OF INSULIN (MULTI): ICD-10-CM

## 2024-12-05 DIAGNOSIS — E11.22 TYPE 2 DIABETES MELLITUS WITH STAGE 3A CHRONIC KIDNEY DISEASE, WITHOUT LONG-TERM CURRENT USE OF INSULIN (MULTI): ICD-10-CM

## 2024-12-05 DIAGNOSIS — N18.31 TYPE 2 DIABETES MELLITUS WITH STAGE 3A CHRONIC KIDNEY DISEASE, WITHOUT LONG-TERM CURRENT USE OF INSULIN (MULTI): ICD-10-CM

## 2024-12-05 NOTE — PROGRESS NOTES
Daily Call Note: Weekly HHVC complete with Dr. Mello and Monty De León, PharmD. Patient states he is doing fine. He is back to work and reports he has a lot of energy. He denies CP, SOB and LE swelling. He reports his BM's are not as regular as usual. He asked about foods he can or can't eat. Dr. Mello recommended he research Mediterranean diet for healthy food choices. She recommended lean meats and more vegetables. He started the Jardiance, Monty will get him information to get 30 day free trial and he states he submitted all information to his employer for insurance, so now he is just waiting. Patient reports his BP today 164/103, this was before his medications, per Dr. Mello-no medication changes at this time. He states he does not have a glucometer to check his blood sugars. He states he checked it recently with his parents glucometer and it was 201. Monty recommends we get him set up with glucometer once approved for insurance. On recent lab work, glucose was 154, Monty does not think he needs to pay OOP for glucometer, he can wait for insurance approval. Reviewed [facility] cardi[facility] appointment and [facility], verbalized understanding No other questions at this time.    Pt Education: per POC  Barriers: none  Topics for Daily Review: BP, weight  Pt demonstrates clear understanding: Yes    Daily Weight:  There were no vitals filed for this visit.   Last 3 Weights:  Wt Readings from Last 7 Encounters:   12/03/24 115 kg (254 lb 9.6 oz)   12/02/24 117 kg (257 lb 6.4 oz)   11/26/24 118 kg (260 lb 9.6 oz)   11/20/20 (!) 145 kg (319 lb)       Masimo Device: No   Masimo Clinical Impression: n/a    Virtual Visits--Scheduled (Most Recent Date at Top)  Follow up Appointments  Recent Visits  No visits were found meeting these conditions.  Showing recent visits within past 30 days and meeting all other requirements  Future Appointments  No visits were found meeting these conditions.  Showing future  appointments within next 90 days and meeting all other requirements       Frequency of RN Calls & Virtual Visits per Team Agreement: Healthy at Home Frequency: Daily    Medication issues Addressed (what was done): none    Follow up appointments scheduled by [facility] Staff: none  Referrals made by [facility] staff: none

## 2024-12-05 NOTE — PROGRESS NOTES
[facility] Home [facility]    Polo Orellana is a 49 y.o. male was referred to Clinical Pharmacy Team to complete a post-discharge medication optimization and monitoring visit.  The patient was referred for CHF management while in [facility]. Today was our second visit.       Referring Provider: Valentina Mello MD  PCP: No Assigned PCP Generic Provider, MD - needs to establish with new pcp once he gets active insurance       Subjective   No Known Allergies    [id number] - [place], [place] - [address]  [address]  [facility] [address]  Phone: [phone] Fax: [phone]      Medication System Management:  Affordability/Accessibility: no active insurance   Adherence/Organization: no issues       Social History     Social History Narrative    Not on file          HPI  CHF ASSESSMENT  Staging:  Most recent ejection fraction: 40-45%  NYHA Stage: II  ACC/AHA Stage: C    Symptom Assessment:  Weight changes/edema?: Yes - down 39 lbs from admission date  Dyspnea?: None  Dizziness/syncope/palpitations?: No    Current Regimen:  ARNI/ACEi/ARB: Yes - entresto  Beta Blocker: Yes - carvedilol  MRA: Yes - spironolactone   SGLT2i: Yes - jardiance     Other therapy:  Amlodipine   Lasix     Secondary Prevention:  The ASCVD Risk score (Unique TORRES, et al., 2019) failed to calculate for the following reasons:    Risk score cannot be calculated because patient has a medical history suggesting prior/existing ASCVD    Aspirin 81mg? yes  Statin?: No  HTN?: No     Review of Systems        Objective     There were no vitals taken for this visit.   BP Readings from Last 4 Encounters:   12/03/24 (!) 166/106   12/02/24 125/87   11/30/24 (!) 139/93   11/26/24 (!) 150/104      There were no vitals filed for this visit.     LAB  Lab Results   Component Value Date    BILITOT 0.8 12/03/2024    CALCIUM 9.7 12/03/2024    CO2 28 12/03/2024     12/03/2024    CREATININE 1.42 (H) 12/03/2024    GLUCOSE 154  (H) 12/03/2024    ALKPHOS 90 12/03/2024    K 4.9 12/03/2024    PROT 7.4 12/03/2024     12/03/2024    AST 29 12/03/2024    ALT 45 12/03/2024    BUN 25 (H) 12/03/2024    ANIONGAP 10 12/03/2024    MG 1.97 11/23/2024    ALBUMIN 4.3 12/03/2024     Lab Results   Component Value Date    TRIG 70 11/23/2024    CHOL 133 11/23/2024    LDLCALC 90 11/23/2024    HDL 29.1 11/23/2024     Lab Results   Component Value Date    HGBA1C 7.1 (H) 11/22/2024         Current Outpatient Medications   Medication Instructions    amLODIPine (NORVASC) 10 mg, oral, Daily    aspirin 81 mg, oral, Daily    carvedilol (COREG) 12.5 mg, oral, 2 times daily    empagliflozin (JARDIANCE) 10 mg, oral, Daily    ergocalciferol (VITAMIN D-2) 50,000 Units, oral, Weekly    furosemide (LASIX) 40 mg, oral, Daily    nitroglycerin (NITROSTAT) 0.4 mg, sublingual, Every 5 min PRN    sacubitriL-valsartan (Entresto) 49-51 mg tablet 1 tablet, oral, 2 times daily    spironolactone (ALDACTONE) 25 mg, oral, Daily          Assessment/Plan   Problem List Items Addressed This Visit       Acute congestive heart failure, unspecified heart failure type     Other Visit Diagnoses       Type 2 diabetes mellitus without complication, without long-term current use of insulin (Multi)                CHF  EF was 40-45% while admitted   Checking his weights and BP's daily (BP's only in the morning before meds)  Feels like he has more energy again now   Also is back to work and doing well   Had repeat blood work done on Tuesday and kidney function still looked fine   DM  A1c was 7.1% while admitted   He does not check his sugars at home  Does not have his own glucometer and supplies --> discussed it is okay to wait until he does get insurance before needing to get own supplies and start checking     /103  Weight 257 lbs     Medications Changes/Concerns:  CHF  Current GDMT --> carvedilol, entresto, jardiance and spironolactone   Have room to increase/adjust meds if needed if  BP's remain elevated   Lasix daily   DM  Started on Jardiance daily   Was not taking any therapies prior   Would benefit from GLP1 for cardiovascular benefits and for weight loss but will need to wait until has active insurance       Refills Needed:  -none needed today       Plan/To Do:  -will give his pharmacy free trial info for the refills on his Jardiance and Entresto since he paid out of pocket for the first fills   -continue daily weights and BP's --> encouraged to also try and check BP after taking his medications too   -nursing will continue with calls       [facility] PAP Status:  -n/a  -no active prescription insurance currently  -working on trying to get through employer - states he did submit all of the info for this       Time Spent with Patient and [facility] Team - Dr. Mello and Evy PAK RN via phone call: 25 minutes      Follow Up: 1 week     Continue all meds under the continuation of care with the referring provider and clinical pharmacy team.    Monty Fofana, PharmD     Verbal consent to manage patient's drug therapy was obtained from the patient. They were informed they may decline to participate or withdraw from participation in pharmacy services at any time.

## 2024-12-05 NOTE — PROGRESS NOTES
1910- Attempted daily call, no answer- LVM reminding of next [facility] tomorrow & asked patient to call back with any needs & to record daily weight/vitals.

## 2024-12-07 ENCOUNTER — PATIENT OUTREACH (OUTPATIENT)
Dept: CARE COORDINATION | Age: 49
End: 2024-12-07

## 2024-12-07 VITALS
WEIGHT: 255 LBS | BODY MASS INDEX: 42.43 KG/M2 | DIASTOLIC BLOOD PRESSURE: 102 MMHG | HEART RATE: 85 BPM | SYSTOLIC BLOOD PRESSURE: 160 MMHG

## 2024-12-08 NOTE — PROGRESS NOTES
1935- Daily call completed with patient. Doing very well today. Reports no CP, SB, or headache. Recent [facility] completed & reports no needs or issues to address at this time. Vitals reported. Did not obtain BS today.     Weight: 255 lbs  BP: 160/102   HR: 85

## 2024-12-09 ENCOUNTER — PATIENT OUTREACH (OUTPATIENT)
Dept: CARE COORDINATION | Age: 49
End: 2024-12-09

## 2024-12-09 VITALS — DIASTOLIC BLOOD PRESSURE: 91 MMHG | SYSTOLIC BLOOD PRESSURE: 144 MMHG | HEART RATE: 91 BPM

## 2024-12-10 ENCOUNTER — PATIENT OUTREACH (OUTPATIENT)
Dept: HOME HEALTH SERVICES | Age: 49
End: 2024-12-10

## 2024-12-10 NOTE — PROGRESS NOTES
Daily Call Note:   12/9/24 Daily call completed. Patient stated that he is doing well. Denies any shortness of breath and chest pain. No new or worsening symptoms reported. Continues to monitor vitals and weight daily.  Pt Education:   Barriers: None  Topics for Daily Review: DW and Vitals  Pt demonstrates clear understanding: Yes      /91  HR 91    Daily Weight:  There were no vitals filed for this visit.   Last 3 Weights:  Wt Readings from Last 7 Encounters:   12/07/24 116 kg (255 lb)   12/03/24 115 kg (254 lb 9.6 oz)   12/02/24 117 kg (257 lb 6.4 oz)   11/26/24 118 kg (260 lb 9.6 oz)   11/20/20 (!) 145 kg (319 lb)       Masimo Device: No   Masimo Clinical Impression: N/A    Virtual Visits--Scheduled (Most Recent Date at Top)  Follow up Appointments  Recent Visits  No visits were found meeting these conditions.  Showing recent visits within past 30 days and meeting all other requirements  Future Appointments  No visits were found meeting these conditions.  Showing future appointments within next 90 days and meeting all other requirements       Frequency of RN Calls & Virtual Visits per Team Agreement: Healthy at Home Frequency: Daily    Medication issues Addressed (what was done): None    Follow up appointments scheduled by [facility] Staff: None  Referrals made by [facility] staff: None

## 2024-12-10 NOTE — PROGRESS NOTES
Daily call complete denies SOB or distress.  Patient states he feels good, reports no new or worsening issues.    255 lb, 142/96, 87    Upcoming appointments reviewed and pt has no new questions or concerns.

## 2024-12-11 ENCOUNTER — DOCUMENTATION (OUTPATIENT)
Dept: CARE COORDINATION | Age: 49
End: 2024-12-11

## 2024-12-11 VITALS
SYSTOLIC BLOOD PRESSURE: 147 MMHG | HEART RATE: 92 BPM | DIASTOLIC BLOOD PRESSURE: 88 MMHG | BODY MASS INDEX: 42.77 KG/M2 | WEIGHT: 257 LBS

## 2024-12-11 DIAGNOSIS — I50.9 ACUTE CONGESTIVE HEART FAILURE, UNSPECIFIED HEART FAILURE TYPE: Primary | ICD-10-CM

## 2024-12-11 DIAGNOSIS — E11.9 TYPE 2 DIABETES MELLITUS WITHOUT COMPLICATION, WITHOUT LONG-TERM CURRENT USE OF INSULIN (MULTI): ICD-10-CM

## 2024-12-12 ENCOUNTER — PATIENT OUTREACH (OUTPATIENT)
Dept: HOME HEALTH SERVICES | Age: 49
End: 2024-12-12

## 2024-12-12 ENCOUNTER — APPOINTMENT (OUTPATIENT)
Dept: CARE COORDINATION | Age: 49
End: 2024-12-12

## 2024-12-12 ENCOUNTER — TELEMEDICINE (OUTPATIENT)
Dept: PHARMACY | Facility: HOSPITAL | Age: 49
End: 2024-12-12

## 2024-12-12 VITALS
WEIGHT: 256 LBS | SYSTOLIC BLOOD PRESSURE: 128 MMHG | HEART RATE: 87 BPM | DIASTOLIC BLOOD PRESSURE: 88 MMHG | BODY MASS INDEX: 42.6 KG/M2

## 2024-12-12 DIAGNOSIS — E11.22 TYPE 2 DIABETES MELLITUS WITH STAGE 3A CHRONIC KIDNEY DISEASE, WITHOUT LONG-TERM CURRENT USE OF INSULIN (MULTI): Primary | ICD-10-CM

## 2024-12-12 DIAGNOSIS — N18.31 TYPE 2 DIABETES MELLITUS WITH STAGE 3A CHRONIC KIDNEY DISEASE, WITHOUT LONG-TERM CURRENT USE OF INSULIN (MULTI): Primary | ICD-10-CM

## 2024-12-12 DIAGNOSIS — E11.9 TYPE 2 DIABETES MELLITUS WITHOUT COMPLICATION, WITHOUT LONG-TERM CURRENT USE OF INSULIN (MULTI): ICD-10-CM

## 2024-12-12 DIAGNOSIS — I50.9 ACUTE CONGESTIVE HEART FAILURE, UNSPECIFIED HEART FAILURE TYPE: ICD-10-CM

## 2024-12-12 RX ORDER — CARVEDILOL 12.5 MG/1
12.5 TABLET ORAL 2 TIMES DAILY
Qty: 60 TABLET | Refills: 11 | Status: SHIPPED | OUTPATIENT
Start: 2024-12-12

## 2024-12-12 RX ORDER — METFORMIN HYDROCHLORIDE 500 MG/1
500 TABLET ORAL
Qty: 60 TABLET | Refills: 3 | Status: SHIPPED | OUTPATIENT
Start: 2024-12-12 | End: 2026-01-16

## 2024-12-12 NOTE — PROGRESS NOTES
Patient called back. Reported vitals and weight. Denies any chest pain, SOB, new or worsening symptoms.  /88  HR 92

## 2024-12-12 NOTE — PROGRESS NOTES
Daily Call Note:     [facility] call completed with the patient, Monty Doherty, and the Nurse.  He states that he is doing okay.  His /88, HR 87, and 256 lbs.  He still doesn't have Insurance and won't from his employer until the beginning of the year.  Provider wants to start patient on Metformin via [employer].  Provider will put in notes for next call to have that Provider put in labs.  Pt had no add'l questions or concerns during the call.  Next [facility] 12/19 @ 1430.      Pt Education:   Barriers:   Topics for Daily Review:   Pt demonstrates clear understanding:     Daily Weight:  There were no vitals filed for this visit.   Last 3 Weights:  Wt Readings from Last 7 Encounters:   12/11/24 117 kg (257 lb)   12/07/24 116 kg (255 lb)   12/03/24 115 kg (254 lb 9.6 oz)   12/02/24 117 kg (257 lb 6.4 oz)   12/10/24 116 kg (255 lb)   11/26/24 118 kg (260 lb 9.6 oz)   11/20/20 (!) 145 kg (319 lb)       Masimo Device:    Masimo Clinical Impression:     Virtual Visits--Scheduled (Most Recent Date at Top)  Follow up Appointments  Recent Visits  No visits were found meeting these conditions.  Showing recent visits within past 30 days and meeting all other requirements  Future Appointments  No visits were found meeting these conditions.  Showing future appointments within next 90 days and meeting all other requirements       Frequency of RN Calls & Virtual Visits per Team Agreement:     Medication issues Addressed (what was done):     Follow up appointments scheduled by [facility] Staff:   Referrals made by [facility] staff:

## 2024-12-12 NOTE — PROGRESS NOTES
[facility]    Polo Orellana is a 49 y.o. male was referred to Clinical Pharmacy Team to complete a post-discharge medication optimization and monitoring visit.  The patient was referred for CHF management while in [facility]. Today was our third visit.       Referring Provider: Marizol Jung MD  PCP: No Assigned PCP Generic Provider, MD - still needs to establish once has active insurance       Subjective   No Known Allergies    [id number] - [place], [place] - [address]  [address]  [facility] [address]  Phone: [phone] Fax: [phone]      Medication System Management:  Affordability/Accessibility: no active insurance   Adherence/Organization: no issues       Social History     Social History Narrative    Not on file          HPI  CHF ASSESSMENT  Staging:  Most recent ejection fraction: 40-45%  NYHA Stage: II  ACC/AHA Stage: C     Symptom Assessment:  Weight changes/edema?: Yes - down 1 lb  Dyspnea?: None  Dizziness/syncope/palpitations?: No     Current Regimen:  ARNI/ACEi/ARB: Yes - entresto  Beta Blocker: Yes - carvedilol  MRA: Yes - spironolactone   SGLT2i: Yes - jardiance      Other therapy:  Amlodipine   Lasix      Secondary Prevention:  The ASCVD Risk score (Unique TORRES, et al., 2019) failed to calculate for the following reasons:    Risk score cannot be calculated because patient has a medical history suggesting prior/existing ASCVD     Aspirin 81mg? yes  Statin?: No  HTN?: No     Review of Systems        Objective     There were no vitals taken for this visit.   BP Readings from Last 4 Encounters:   12/11/24 147/88   12/09/24 (!) 144/91   12/07/24 (!) 160/102   12/03/24 (!) 166/106      There were no vitals filed for this visit.     LAB  Lab Results   Component Value Date    BILITOT 0.8 12/03/2024    CALCIUM 9.7 12/03/2024    CO2 28 12/03/2024     12/03/2024    CREATININE 1.42 (H) 12/03/2024    GLUCOSE 154 (H) 12/03/2024    ALKPHOS  90 12/03/2024    K 4.9 12/03/2024    PROT 7.4 12/03/2024     12/03/2024    AST 29 12/03/2024    ALT 45 12/03/2024    BUN 25 (H) 12/03/2024    ANIONGAP 10 12/03/2024    MG 1.97 11/23/2024    ALBUMIN 4.3 12/03/2024     Lab Results   Component Value Date    TRIG 70 11/23/2024    CHOL 133 11/23/2024    LDLCALC 90 11/23/2024    HDL 29.1 11/23/2024     Lab Results   Component Value Date    HGBA1C 7.1 (H) 11/22/2024         Current Outpatient Medications   Medication Instructions    amLODIPine (NORVASC) 10 mg, oral, Daily    aspirin 81 mg, oral, Daily    carvedilol (COREG) 12.5 mg, oral, 2 times daily    empagliflozin (JARDIANCE) 10 mg, oral, Daily    ergocalciferol (VITAMIN D-2) 50,000 Units, oral, Weekly    furosemide (LASIX) 40 mg, oral, Daily    nitroglycerin (NITROSTAT) 0.4 mg, sublingual, Every 5 min PRN    sacubitriL-valsartan (Entresto) 49-51 mg tablet 1 tablet, oral, 2 times daily    spironolactone (ALDACTONE) 25 mg, oral, Daily          Assessment/Plan   Problem List Items Addressed This Visit       Acute congestive heart failure, unspecified heart failure type     Other Visit Diagnoses       Type 2 diabetes mellitus without complication, without long-term current use of insulin (Multi)                CHF  EF was 40-45% while admitted   Checking his weights and BP's daily (BP's only in the morning before meds)  Feels like he has more energy again now   Also is back to work and doing well   DM  A1c was 7.1% while admitted   He does not check his sugars at home  Does not have his own glucometer and supplies --> discussed it is okay to wait until he does get insurance before needing to get own supplies and start checking     /88  HR 87  Weight 256 lbs (down 1 lb from visit last week)    Medications Changes/Concerns:  CHF  Current GDMT --> carvedilol, entresto, jardiance and spironolactone   Lasix daily   DM  Started on Jardiance daily   Was not taking any therapies prior   Would benefit from GLP1 for  cardiovascular benefits and for weight loss but will need to wait until has active insurance   Will start Metformin though today       Refills Needed:  -none needed today      Plan/To Do:  -send new prescription for Metformin to [facility] pharmacy   -continue to log BP's and weights  -send the free trial cards to [facility] for next fill of the entresto and jardiance   -nursing to continue with calls       [facility] PAP Status:  -n/a  -no active prescription insurance currently       Time Spent with Patient and [facility] Team - Dr. Jung and Evy MILES RN via phone call: 15 minutes      Follow Up: 1 week     Continue all meds under the continuation of care with the referring provider and clinical pharmacy team.    Monty Fofana, Lisa     Verbal consent to manage patient's drug therapy was obtained from the patient. They were informed they may decline to participate or withdraw from participation in pharmacy services at any time.

## 2024-12-13 ENCOUNTER — PATIENT OUTREACH (OUTPATIENT)
Dept: HOME HEALTH SERVICES | Age: 49
End: 2024-12-13

## 2024-12-13 NOTE — PROGRESS NOTES
Daily call complete denies SOB or distress.  Patient states he feels good, reports no new or worsening issues.      255 lb, 155/96, 89    Upcoming appointments reviewed.  Pt has no questions or concerns at this time.

## 2024-12-14 ENCOUNTER — PATIENT OUTREACH (OUTPATIENT)
Dept: CARE COORDINATION | Age: 49
End: 2024-12-14

## 2024-12-14 VITALS — DIASTOLIC BLOOD PRESSURE: 100 MMHG | HEART RATE: 98 BPM | SYSTOLIC BLOOD PRESSURE: 151 MMHG

## 2024-12-15 NOTE — PROGRESS NOTES
Daily call completed. Pt states he is doing well. Denies any new symptoms or concerns. Denies need for med refills at this time. Denies any further questions/concerns/needs at this time. Aware to call in with any that develop. Aware of upcoming appts. Daily calls dropped to T/TH evening. [facility] 12/19 @ 1430.     151/100  Hr 98

## 2024-12-17 ENCOUNTER — PATIENT OUTREACH (OUTPATIENT)
Dept: CARE COORDINATION | Age: 49
End: 2024-12-17

## 2024-12-17 ENCOUNTER — PATIENT OUTREACH (OUTPATIENT)
Dept: HOME HEALTH SERVICES | Age: 49
End: 2024-12-17

## 2024-12-17 NOTE — PROGRESS NOTES
Daily call complete, denies SOB or distress.  Current weight 256 lbs morning /91, 92.  Requested pt to obtain a BP now, he will call back with results.      Upcoming appointments reviewed, pt has no questions or concerns at this time.

## 2024-12-19 ENCOUNTER — PATIENT OUTREACH (OUTPATIENT)
Dept: CARE COORDINATION | Age: 49
End: 2024-12-19

## 2024-12-19 ENCOUNTER — TELEMEDICINE (OUTPATIENT)
Dept: PHARMACY | Facility: HOSPITAL | Age: 49
End: 2024-12-19

## 2024-12-19 ENCOUNTER — APPOINTMENT (OUTPATIENT)
Dept: CARE COORDINATION | Age: 49
End: 2024-12-19

## 2024-12-19 DIAGNOSIS — I50.9 ACUTE CONGESTIVE HEART FAILURE, UNSPECIFIED HEART FAILURE TYPE: ICD-10-CM

## 2024-12-19 DIAGNOSIS — E11.22 TYPE 2 DIABETES MELLITUS WITH STAGE 3A CHRONIC KIDNEY DISEASE, WITHOUT LONG-TERM CURRENT USE OF INSULIN (MULTI): ICD-10-CM

## 2024-12-19 DIAGNOSIS — E11.9 TYPE 2 DIABETES MELLITUS WITHOUT COMPLICATION, WITHOUT LONG-TERM CURRENT USE OF INSULIN (MULTI): ICD-10-CM

## 2024-12-19 DIAGNOSIS — I50.9 ACUTE CONGESTIVE HEART FAILURE, UNSPECIFIED HEART FAILURE TYPE: Primary | ICD-10-CM

## 2024-12-19 DIAGNOSIS — N18.31 TYPE 2 DIABETES MELLITUS WITH STAGE 3A CHRONIC KIDNEY DISEASE, WITHOUT LONG-TERM CURRENT USE OF INSULIN (MULTI): ICD-10-CM

## 2024-12-19 NOTE — PROGRESS NOTES
Daily Call Note:   [facility] Completed with Dr. Valentina Mello and Monty    /90  HR 85  Wt 256 lb     Denies SOB/leg swelling. He is noticing more pain in his hands.     Patient stated Metformin today.      Patient stated private insurance will be effective 01/01/25..  The [facility] team will schedule a New Patient appointment after Jan 1, 2025.    Attempted to schedule appointment,. Insurance is not updated in [facility].  The [facility] team was unable to update insurance via [employer]. The [facility] team will attempt to run [employer] again 12/23/24 and schedule appointment.     A task was created for follow up.      Mr. Orellana has a cardiology appointment 12/23/24 with Dr. Young.     Advised patient to contact the [facility] Team for question or concerns 24/7.       [facility] 12/27/25 @ 15:00-Graduate if insurance is updated and New Patient PCP appointment is scheduled.           Pt Education:   Barriers:   Topics for Daily Review:   Pt demonstrates clear understanding:   Daily Weight:  There were no vitals filed for this visit.   Last 3 Weights:  Wt Readings from Last 7 Encounters:   12/12/24 116 kg (256 lb)   12/11/24 117 kg (257 lb)   12/07/24 116 kg (255 lb)   12/03/24 115 kg (254 lb 9.6 oz)   12/02/24 117 kg (257 lb 6.4 oz)   12/13/24 117 kg (257 lb)   11/26/24 118 kg (260 lb 9.6 oz)       Masimo Device:    Masimo Clinical Impression:     Virtual Visits--Scheduled (Most Recent Date at Top)  Follow up Appointments  Recent Visits  No visits were found meeting these conditions.  Showing recent visits within past 30 days and meeting all other requirements  Today's Visits  Date Type Provider Dept   12/19/24 Telemedicine Valentina Mello MD [facility]   Showing today's visits and meeting all other requirements  Future Appointments  No visits were found meeting these conditions.  Showing future appointments within next 90 days and meeting all other requirements       Frequency of RN Calls & Virtual Visits per Team Agreement:     Medication  issues Addressed (what was done):     Follow up appointments scheduled by [facility] Staff:   Referrals made by [facility] staff:

## 2024-12-19 NOTE — PROGRESS NOTES
[facility]    Polo Orellana is a 49 y.o. male was referred to Clinical Pharmacy Team to complete a post-discharge medication optimization and monitoring visit.  The patient was referred for CHF management while in [facility]. Today was our fourth visit.       Referring Provider: Valentina Mello MD  PCP: No Assigned PCP Generic Provider, MD - still needs to schedule with new pcp       Subjective   No Known Allergies    [id number] - [place], [place] - [address]  [address]  [facility] [address]  Phone: [phone] Fax: [phone]    [id number] - [place], [place] - [address] [facility]  [address] [place] [address]  Phone: [phone] Fax: [phone]      Medication System Management:  Affordability/Accessibility: no active insurance   Adherence/Organization: no issues       Social History     Social History Narrative    Not on file          HPI  CHF ASSESSMENT  Staging:  Most recent ejection fraction: 40-45%  NYHA Stage: II  ACC/AHA Stage: C     Symptom Assessment:  Weight changes/edema?: No - stable around 256 lbs  Dyspnea?: None  Dizziness/syncope/palpitations?: No     Current Regimen:  ARNI/ACEi/ARB: Yes - entresto  Beta Blocker: Yes - carvedilol  MRA: Yes - spironolactone   SGLT2i: Yes - jardiance      Other therapy:  Amlodipine   Lasix      Secondary Prevention:  The ASCVD Risk score (Unique TORRES, et al., 2019) failed to calculate for the following reasons:    Risk score cannot be calculated because patient has a medical history suggesting prior/existing ASCVD     Aspirin 81mg? yes  Statin?: No  HTN?: No     Review of Systems        Objective     There were no vitals taken for this visit.   BP Readings from Last 4 Encounters:   12/14/24 (!) 151/100   12/12/24 128/88   12/11/24 147/88   12/09/24 (!) 144/91      There were no vitals filed for this visit.     LAB  Lab Results   Component Value Date     BILITOT 0.8 12/03/2024    CALCIUM 9.7 12/03/2024    CO2 28 12/03/2024     12/03/2024    CREATININE 1.42 (H) 12/03/2024    GLUCOSE 154 (H) 12/03/2024    ALKPHOS 90 12/03/2024    K 4.9 12/03/2024    PROT 7.4 12/03/2024     12/03/2024    AST 29 12/03/2024    ALT 45 12/03/2024    BUN 25 (H) 12/03/2024    ANIONGAP 10 12/03/2024    MG 1.97 11/23/2024    ALBUMIN 4.3 12/03/2024     Lab Results   Component Value Date    TRIG 70 11/23/2024    CHOL 133 11/23/2024    LDLCALC 90 11/23/2024    HDL 29.1 11/23/2024     Lab Results   Component Value Date    HGBA1C 7.1 (H) 11/22/2024         Current Outpatient Medications   Medication Instructions    amLODIPine (NORVASC) 10 mg, oral, Daily    aspirin 81 mg, oral, Daily    carvedilol (COREG) 12.5 mg, oral, 2 times daily    empagliflozin (JARDIANCE) 10 mg, oral, Daily    ergocalciferol (VITAMIN D-2) 50,000 Units, oral, Weekly    furosemide (LASIX) 40 mg, oral, Daily    metFORMIN (GLUCOPHAGE) 500 mg, oral, Daily with breakfast    nitroglycerin (NITROSTAT) 0.4 mg, sublingual, Every 5 min PRN    sacubitriL-valsartan (Entresto) 49-51 mg tablet 1 tablet, oral, 2 times daily    spironolactone (ALDACTONE) 25 mg, oral, Daily          Assessment/Plan   Problem List Items Addressed This Visit       Acute congestive heart failure, unspecified heart failure type     Other Visit Diagnoses       Type 2 diabetes mellitus without complication, without long-term current use of insulin (Multi)                CHF  EF was 40-45% while admitted   Checking his weights and BP's daily (BP's only in the morning before meds)  Feels like he has more energy again now   Also is back to work and doing well   DM  A1c was 7.1% while admitted   He does not check his sugars at home  Does not have his own glucometer and supplies --> discussed it is okay to wait until he does get insurance before needing to get own supplies and start checking     /90  HR 85  Weight 256 lbs (stable)    Medications  Changes/Concerns:  CHF  Current GDMT --> carvedilol, entresto, jardiance and spironolactone   Lasix daily   DM  Started on Jardiance daily   Was not taking any therapies prior   Would benefit from GLP1 for cardiovascular benefits and for weight loss but will need to wait until has active insurance   Picked up Metformin only yesterday and started it today       Refills Needed:  -none needed today       Plan/To Do:  -continue to log BP's and weights daily   -nursing to help with scheduling with new pcp   -continue with nursing calls   -will be seeing cardio on Monday        PAP Status:  -n/a  -no active prescription insurance currently   -states he will have active insurance starting the first of the year       [facility] - Dr. Mello and HUAN Lopez via phone call: 10 minutes      Follow Up: 1 week     Continue all meds under the continuation of care with the referring provider and clinical pharmacy team.    Monty Fofana, Lisa     Verbal consent to manage patient's drug therapy was obtained from the patient. They were informed they may decline to participate or withdraw from participation in pharmacy services at any time.

## 2024-12-23 ENCOUNTER — OFFICE VISIT (OUTPATIENT)
Dept: CARDIOLOGY | Facility: HOSPITAL | Age: 49
End: 2024-12-23

## 2024-12-23 VITALS
HEIGHT: 68 IN | BODY MASS INDEX: 38.49 KG/M2 | DIASTOLIC BLOOD PRESSURE: 78 MMHG | SYSTOLIC BLOOD PRESSURE: 135 MMHG | WEIGHT: 254 LBS | HEART RATE: 75 BPM | OXYGEN SATURATION: 99 %

## 2024-12-23 DIAGNOSIS — I50.22 HEART FAILURE WITH MID-RANGE EJECTION FRACTION (HFMEF): Primary | ICD-10-CM

## 2024-12-23 DIAGNOSIS — I10 ESSENTIAL HYPERTENSION: ICD-10-CM

## 2024-12-23 PROBLEM — I50.9 ACUTE CONGESTIVE HEART FAILURE, UNSPECIFIED HEART FAILURE TYPE: Status: RESOLVED | Noted: 2024-11-22 | Resolved: 2024-12-23

## 2024-12-23 PROCEDURE — 3008F BODY MASS INDEX DOCD: CPT | Performed by: INTERNAL MEDICINE

## 2024-12-23 PROCEDURE — 3075F SYST BP GE 130 - 139MM HG: CPT | Performed by: INTERNAL MEDICINE

## 2024-12-23 PROCEDURE — 3078F DIAST BP <80 MM HG: CPT | Performed by: INTERNAL MEDICINE

## 2024-12-23 PROCEDURE — 1036F TOBACCO NON-USER: CPT | Performed by: INTERNAL MEDICINE

## 2024-12-23 PROCEDURE — 99214 OFFICE O/P EST MOD 30 MIN: CPT | Performed by: INTERNAL MEDICINE

## 2024-12-23 NOTE — PROGRESS NOTES
"Chief Complaint:   No chief complaint on file.     History Of Present Illness:    Polo Orellana is a 49 y.o. male here for follow-up. He has a history of hypertension. He was hospitalized 11/2024 with new onset heart failure. His BP was >200 mmHg systolic on presentation. EF was 40-45% by TTE. He was also diagnosed with diabetes mellitus type II.     He reports feeling well and mostly back to normal following his hospitalization. Denies cardiovascular symptoms. Has been compliant with his medications but has had a lapse of insurance which should be re-instated the first of the year.            Last Recorded Vitals:  Vitals:    12/23/24 1443   BP: 135/78   Pulse: 75   SpO2: 99%   Weight: 115 kg (254 lb)   Height: 1.727 m (5' 8\")             Allergies:  Patient has no known allergies.    Outpatient Medications:  Current Outpatient Medications   Medication Instructions    amLODIPine (NORVASC) 10 mg, oral, Daily    aspirin 81 mg, oral, Daily    carvedilol (COREG) 12.5 mg, oral, 2 times daily    empagliflozin (JARDIANCE) 10 mg, oral, Daily    ergocalciferol (VITAMIN D-2) 50,000 Units, oral, Weekly    furosemide (LASIX) 40 mg, oral, Daily    metFORMIN (GLUCOPHAGE) 500 mg, oral, Daily with breakfast    nitroglycerin (NITROSTAT) 0.4 mg, sublingual, Every 5 min PRN    sacubitriL-valsartan (Entresto) 49-51 mg tablet 1 tablet, oral, 2 times daily    spironolactone (ALDACTONE) 25 mg, oral, Daily         Physical Exam:  Gen Well appearing adult male sitting up in NAD. Body mass index is 38.62 kg/m².   CV rrr. No m/r/g appreciated. No JVD or leg edema.  Pulm Lungs clear with normal respiratory effort.  Neuro Alert and conversant. Grossly nonfocal.         I reviewed most recent imaging / labs / and office notes as well as details of any recent hospitalizations or ED visits.    Assessment/Plan   1.  Chronic HFmrEF  His cardiomyopathy is likely due to uncontrolled hypertension. He is on a reasonable regimen. Will rpeeat TTE next " visit.    2. Hypertension   BP fair. Has potential ongoing insurance issues so will not adjust his medications further at this point. Clinical pharmacy referral placed. Monitoring.         Follow-up 3 months with repeat TTE then.        Star Young MD

## 2024-12-26 ENCOUNTER — PATIENT OUTREACH (OUTPATIENT)
Dept: CARE COORDINATION | Age: 49
End: 2024-12-26

## 2024-12-26 VITALS
HEART RATE: 82 BPM | SYSTOLIC BLOOD PRESSURE: 148 MMHG | BODY MASS INDEX: 38.77 KG/M2 | DIASTOLIC BLOOD PRESSURE: 84 MMHG | WEIGHT: 255 LBS

## 2024-12-26 DIAGNOSIS — I50.9 ACUTE CONGESTIVE HEART FAILURE, UNSPECIFIED HEART FAILURE TYPE: Primary | ICD-10-CM

## 2024-12-27 ENCOUNTER — PATIENT OUTREACH (OUTPATIENT)
Dept: CARE COORDINATION | Age: 49
End: 2024-12-27

## 2024-12-27 ENCOUNTER — APPOINTMENT (OUTPATIENT)
Dept: CARE COORDINATION | Age: 49
End: 2024-12-27

## 2024-12-27 DIAGNOSIS — N18.31 TYPE 2 DIABETES MELLITUS WITH STAGE 3A CHRONIC KIDNEY DISEASE, WITHOUT LONG-TERM CURRENT USE OF INSULIN (MULTI): Primary | ICD-10-CM

## 2024-12-27 DIAGNOSIS — I50.22 HEART FAILURE WITH MID-RANGE EJECTION FRACTION (HFMEF): ICD-10-CM

## 2024-12-27 DIAGNOSIS — E11.22 TYPE 2 DIABETES MELLITUS WITH STAGE 3A CHRONIC KIDNEY DISEASE, WITHOUT LONG-TERM CURRENT USE OF INSULIN (MULTI): Primary | ICD-10-CM

## 2024-12-27 NOTE — PROGRESS NOTES
Daily Call Note:   Weekly [facility] call complete w this RN, and Dr Erich Cerda CP/SOB/ edema  B/P 142/91  HR 93  Medications reviewed, no refills necessary  Follow up appts scheduled  All questions, concerns addressed  Pt will graduate from [facility] today.   Pt Education:  POC   Barriers:   Topics for Daily Review:   Pt demonstrates clear understanding: Yes    Daily Weight:  There were no vitals filed for this visit.   Last 3 Weights:  Wt Readings from Last 7 Encounters:   12/26/24 116 kg (255 lb)   12/23/24 115 kg (254 lb)   12/12/24 116 kg (256 lb)   12/11/24 117 kg (257 lb)   12/07/24 116 kg (255 lb)   12/03/24 115 kg (254 lb 9.6 oz)   12/02/24 117 kg (257 lb 6.4 oz)       Masimo Device: No   Masimo Clinical Impression:     Virtual Visits--Scheduled (Most Recent Date at Top)  Follow up Appointments  Recent Visits  No visits were found meeting these conditions.  Showing recent visits within past 30 days and meeting all other requirements  Future Appointments  No visits were found meeting these conditions.  Showing future appointments within next 90 days and meeting all other requirements       Frequency of RN Calls & Virtual Visits per Team Agreement: [facility] Frequency: Bi-Weekly    Medication issues Addressed (what was done):     Follow up appointments scheduled by [facility] Staff:   Referrals made by [facility] staff:

## 2024-12-27 NOTE — PROGRESS NOTES
Daily Call Note:  @ 19:45- Daily call completed. Pt verified by full name and . Pt has no complaints. Denies any SOB, edema or pain. Pt still needs a PCP appointment, Pt states his insurance will come in affect . Will try to schedule one. Pt updated on his cardiology appointment 3/24, VS's 148/84, 82. Weight 255 IBS. Pt states he is following a low NA diet. Next [facility] call  @ 1500.   Pt Education: y  Barriers: n  Topics for Daily Review: need for PCP appointment  Pt demonstrates clear understanding: Yes    Daily Weight:24- 115.6 Kg (255Ibs)  There were no vitals filed for this visit.   Last 3 Weights:  Wt Readings from Last 7 Encounters:   24 115 kg (254 lb)   24 116 kg (256 lb)   24 117 kg (257 lb)   24 116 kg (255 lb)   24 115 kg (254 lb 9.6 oz)   24 117 kg (257 lb 6.4 oz)   24 117 kg (257 lb)       Masimo Device: No   Masimo Clinical Impression:     Virtual Visits--Scheduled (Most Recent Date at Top)  Follow up Appointments  Recent Visits  No visits were found meeting these conditions.  Showing recent visits within past 30 days and meeting all other requirements  Future Appointments  No visits were found meeting these conditions.  Showing future appointments within next 90 days and meeting all other requirements       Frequency of RN Calls & Virtual Visits per Team Agreement: [facility] Frequency: Bi-Weekly    Medication issues Addressed (what was done): n    Follow up appointments scheduled by [facility] Staff: n  Referrals made by [facility] staff: n

## 2025-02-07 ENCOUNTER — OFFICE VISIT (OUTPATIENT)
Dept: PRIMARY CARE | Facility: CLINIC | Age: 50
End: 2025-02-07
Payer: COMMERCIAL

## 2025-02-07 ENCOUNTER — APPOINTMENT (OUTPATIENT)
Dept: PRIMARY CARE | Facility: CLINIC | Age: 50
End: 2025-02-07

## 2025-02-07 VITALS — SYSTOLIC BLOOD PRESSURE: 132 MMHG | WEIGHT: 246 LBS | DIASTOLIC BLOOD PRESSURE: 76 MMHG | BODY MASS INDEX: 37.4 KG/M2

## 2025-02-07 DIAGNOSIS — I50.9 ACUTE CONGESTIVE HEART FAILURE, UNSPECIFIED HEART FAILURE TYPE: ICD-10-CM

## 2025-02-07 DIAGNOSIS — Z00.00 HEALTH CARE MAINTENANCE: Primary | ICD-10-CM

## 2025-02-07 DIAGNOSIS — E03.9 HYPOTHYROIDISM, UNSPECIFIED TYPE: ICD-10-CM

## 2025-02-07 DIAGNOSIS — I50.22 CHRONIC SYSTOLIC CONGESTIVE HEART FAILURE: ICD-10-CM

## 2025-02-07 DIAGNOSIS — I50.22 HEART FAILURE WITH MID-RANGE EJECTION FRACTION (HFMEF): ICD-10-CM

## 2025-02-07 DIAGNOSIS — E11.9 TYPE 2 DIABETES MELLITUS WITHOUT COMPLICATION, WITHOUT LONG-TERM CURRENT USE OF INSULIN (MULTI): ICD-10-CM

## 2025-02-07 DIAGNOSIS — I12.9 HYPERTENSION WITH RENAL DISEASE: Primary | ICD-10-CM

## 2025-02-07 PROCEDURE — 3075F SYST BP GE 130 - 139MM HG: CPT | Performed by: INTERNAL MEDICINE

## 2025-02-07 PROCEDURE — 99204 OFFICE O/P NEW MOD 45 MIN: CPT | Performed by: INTERNAL MEDICINE

## 2025-02-07 PROCEDURE — 3078F DIAST BP <80 MM HG: CPT | Performed by: INTERNAL MEDICINE

## 2025-02-07 RX ORDER — AMLODIPINE BESYLATE 10 MG/1
10 TABLET ORAL DAILY
Qty: 60 TABLET | Refills: 0 | Status: SHIPPED | OUTPATIENT
Start: 2025-02-07

## 2025-02-07 RX ORDER — FUROSEMIDE 40 MG/1
40 TABLET ORAL DAILY
Qty: 60 TABLET | Refills: 0 | Status: SHIPPED | OUTPATIENT
Start: 2025-02-07

## 2025-02-07 NOTE — PROGRESS NOTES
Subjective   Patient ID: Polo Orellana is a 49 y.o. male who presents for No chief complaint on file..    HPI   The patient for first time septated [facility] patient walked in no chest pain no shortness of breath no side effect with medication no polyuria polydipsia no bowel issues no dysuria bones muscles joints okay    Past medical history hypertension diabetes mellitus CHF    Medications noted and unchanged including amlodipine and carvedilol Jardiance furosemide metformin Entresto spironolactone    Allergies no known drug allergies    Social history no tobacco    Prevention some exercise some recent blood work reviewed  Review of Systems    Objective   There were no vitals taken for this visit.    Physical Exam vital signs noted alert and oriented x 3 NCAT no JVD or bruit chest clear to auscultation and percussion CV regular rate and rhythm S1-S2 without murmur gallop or rub abdomen soft obese nontender normal active bowel sounds LS spine normal curvature negative straight leg raise extremities no clubbing cyanosis or edema trace peripheral edema nonpitting normal distal pulses DTR 2+    Assessment/Plan    impression CHF hypertension with renal other diagnoses diabetes mellitus hypothyroid  Plan check on home blood sugars and monitor A1c and medicatio reviewed most recent laboratory results weights and cardiovascular testing at 1 time was over 300 pounds prior to initial diuresis watch salt in diet advised on furosemide and refill medications as needed  watch overall diet exercise as able further weight loss follow-up with cardiology advised on next blood work about one month after review while [id number] with same medication and screening then recheck 1 month based on above TT 50 cc 26    Review chart (renal insufficiency, low vitamin D, tsh elevated, TC low, A1c around 7, echo with lvh and lvef 45%)  Put in any blood work (A1c, tsh) advise on diabetes and thyroid supplement if needed  medications (reviewed and  reconciled)  RAC

## 2025-03-03 DIAGNOSIS — I50.9 ACUTE CONGESTIVE HEART FAILURE, UNSPECIFIED HEART FAILURE TYPE: ICD-10-CM

## 2025-03-07 RX ORDER — FUROSEMIDE 40 MG/1
40 TABLET ORAL DAILY
Qty: 60 TABLET | Refills: 0 | Status: SHIPPED | OUTPATIENT
Start: 2025-03-07

## 2025-03-07 RX ORDER — AMLODIPINE BESYLATE 10 MG/1
10 TABLET ORAL DAILY
Qty: 60 TABLET | Refills: 0 | Status: SHIPPED | OUTPATIENT
Start: 2025-03-07

## 2025-03-24 ENCOUNTER — APPOINTMENT (OUTPATIENT)
Dept: CARDIOLOGY | Facility: HOSPITAL | Age: 50
End: 2025-03-24
Payer: COMMERCIAL

## 2025-04-25 DIAGNOSIS — I50.9 ACUTE CONGESTIVE HEART FAILURE, UNSPECIFIED HEART FAILURE TYPE: ICD-10-CM

## 2025-04-26 RX ORDER — AMLODIPINE BESYLATE 10 MG/1
10 TABLET ORAL DAILY
Qty: 60 TABLET | Refills: 0 | Status: SHIPPED | OUTPATIENT
Start: 2025-04-26

## 2025-04-26 RX ORDER — FUROSEMIDE 40 MG/1
40 TABLET ORAL DAILY
Qty: 60 TABLET | Refills: 0 | Status: SHIPPED | OUTPATIENT
Start: 2025-04-26

## 2025-05-13 ENCOUNTER — APPOINTMENT (OUTPATIENT)
Dept: PRIMARY CARE | Facility: CLINIC | Age: 50
End: 2025-05-13
Payer: COMMERCIAL

## 2025-05-13 VITALS — WEIGHT: 265 LBS | BODY MASS INDEX: 40.29 KG/M2 | SYSTOLIC BLOOD PRESSURE: 141 MMHG | DIASTOLIC BLOOD PRESSURE: 81 MMHG

## 2025-05-13 DIAGNOSIS — Z00.00 HEALTH CARE MAINTENANCE: Primary | ICD-10-CM

## 2025-05-13 DIAGNOSIS — I50.22 CHRONIC SYSTOLIC CONGESTIVE HEART FAILURE: ICD-10-CM

## 2025-05-13 DIAGNOSIS — E11.9 TYPE 2 DIABETES MELLITUS WITHOUT COMPLICATION, WITHOUT LONG-TERM CURRENT USE OF INSULIN: ICD-10-CM

## 2025-05-13 DIAGNOSIS — I12.9 HYPERTENSION WITH RENAL DISEASE: ICD-10-CM

## 2025-05-13 DIAGNOSIS — E03.9 HYPOTHYROIDISM, UNSPECIFIED TYPE: ICD-10-CM

## 2025-05-13 PROCEDURE — 3079F DIAST BP 80-89 MM HG: CPT | Performed by: INTERNAL MEDICINE

## 2025-05-13 PROCEDURE — 3077F SYST BP >= 140 MM HG: CPT | Performed by: INTERNAL MEDICINE

## 2025-05-13 PROCEDURE — 99214 OFFICE O/P EST MOD 30 MIN: CPT | Performed by: INTERNAL MEDICINE

## 2025-05-13 NOTE — PROGRESS NOTES
Subjective   Patient ID: Polo Orellana is a 49 y.o. male who presents for No chief complaint on file..    HPI follow-up visit no chest pain or shortness of breath no side effect with medication no polyuria polydipsia has not picked up glucometer yet at this time previous laboratory results were reviewed medications were reviewed has not scheduled for echo or followed up with cardiology yet vital signs noted alert and oriented x 3 NCAT no JVD chest clear to auscultation CV regular rate rhythm S1-S2 no lid lag no thyromegaly abdomen soft obese nontender normal active bowel sounds extremities no clubbing cyanosis or edema normal distal pulses    Review of Systems    Objective   There were no vitals taken for this visit.    Physical Exam  Impression diabetes mellitus hypertension with renal hyperlipidemia?  Hypothyroidism CHF  Plan watch salt in diet regular aerobic exercise follow-up for echocardiogram and with cardiology while continuing with medication  Check A1c advised on long-term blood sugar test he will obtain glucometer from the pharmacy advise on medication  Diet exercise weight loss good water consumption he no longer smokes or drinks for overall health  Check Chem-7 advised on glucose potassium and kidney function especially the kidney function and advised on blood pressure blood pressure medicine check hepatic panel advised on liver enzymes check lipid panel advised on cholesterol profile then follow-up with check TSH advised on thyroid and medication if needed and recheck 3 months based on above  tt40 cc21    Assessment/Plan

## 2025-05-14 LAB
ALBUMIN SERPL-MCNC: 4.6 G/DL (ref 3.6–5.1)
ALP SERPL-CCNC: 81 U/L (ref 35–144)
ALT SERPL-CCNC: 18 U/L (ref 9–46)
ANION GAP SERPL CALCULATED.4IONS-SCNC: 11 MMOL/L (CALC) (ref 7–17)
AST SERPL-CCNC: 14 U/L (ref 10–35)
BILIRUB SERPL-MCNC: 0.6 MG/DL (ref 0.2–1.2)
BUN SERPL-MCNC: 19 MG/DL (ref 7–25)
CALCIUM SERPL-MCNC: 9.1 MG/DL (ref 8.6–10.3)
CHLORIDE SERPL-SCNC: 101 MMOL/L (ref 98–110)
CHOLEST SERPL-MCNC: 165 MG/DL
CHOLEST/HDLC SERPL: 3.6 (CALC)
CO2 SERPL-SCNC: 28 MMOL/L (ref 20–32)
CREAT SERPL-MCNC: 1.29 MG/DL (ref 0.7–1.3)
EGFRCR SERPLBLD CKD-EPI 2021: 68 ML/MIN/1.73M2
EST. AVERAGE GLUCOSE BLD GHB EST-MCNC: 111 MG/DL
EST. AVERAGE GLUCOSE BLD GHB EST-SCNC: 6.2 MMOL/L
GLUCOSE SERPL-MCNC: 154 MG/DL (ref 65–99)
HBA1C MFR BLD: 5.5 %
HDLC SERPL-MCNC: 46 MG/DL
LDLC SERPL CALC-MCNC: 105 MG/DL (CALC)
NONHDLC SERPL-MCNC: 119 MG/DL (CALC)
POTASSIUM SERPL-SCNC: 4.5 MMOL/L (ref 3.5–5.3)
PROT SERPL-MCNC: 7.3 G/DL (ref 6.1–8.1)
SODIUM SERPL-SCNC: 140 MMOL/L (ref 135–146)
TRIGL SERPL-MCNC: 56 MG/DL
TSH SERPL-ACNC: 1.13 MIU/L (ref 0.4–4.5)

## 2025-07-22 DIAGNOSIS — I50.9 ACUTE CONGESTIVE HEART FAILURE, UNSPECIFIED HEART FAILURE TYPE: ICD-10-CM

## 2025-07-26 RX ORDER — AMLODIPINE BESYLATE 10 MG/1
10 TABLET ORAL DAILY
Qty: 60 TABLET | Refills: 0 | Status: SHIPPED | OUTPATIENT
Start: 2025-07-26

## 2025-07-26 RX ORDER — FUROSEMIDE 40 MG/1
40 TABLET ORAL DAILY
Qty: 60 TABLET | Refills: 0 | Status: SHIPPED | OUTPATIENT
Start: 2025-07-26

## 2025-08-19 DIAGNOSIS — I50.9 ACUTE CONGESTIVE HEART FAILURE, UNSPECIFIED HEART FAILURE TYPE: ICD-10-CM

## 2025-08-23 RX ORDER — FUROSEMIDE 40 MG/1
40 TABLET ORAL DAILY
Qty: 90 TABLET | Refills: 0 | Status: SHIPPED | OUTPATIENT
Start: 2025-08-23

## 2025-08-23 RX ORDER — AMLODIPINE BESYLATE 10 MG/1
10 TABLET ORAL DAILY
Qty: 60 TABLET | Refills: 0 | Status: SHIPPED | OUTPATIENT
Start: 2025-08-23

## 2025-11-14 ENCOUNTER — APPOINTMENT (OUTPATIENT)
Dept: PRIMARY CARE | Facility: CLINIC | Age: 50
End: 2025-11-14
Payer: COMMERCIAL

## 2025-12-08 ENCOUNTER — APPOINTMENT (OUTPATIENT)
Dept: PRIMARY CARE | Facility: CLINIC | Age: 50
End: 2025-12-08